# Patient Record
Sex: FEMALE | Race: BLACK OR AFRICAN AMERICAN | NOT HISPANIC OR LATINO | Employment: UNEMPLOYED | URBAN - METROPOLITAN AREA
[De-identification: names, ages, dates, MRNs, and addresses within clinical notes are randomized per-mention and may not be internally consistent; named-entity substitution may affect disease eponyms.]

---

## 2023-05-03 LAB
ALANINE AMINOTRANSFERASE (SGPT) (U/L) IN SER/PLAS: 27 U/L (ref 7–45)
ALBUMIN (G/DL) IN SER/PLAS: 4.2 G/DL (ref 3.4–5)
ALKALINE PHOSPHATASE (U/L) IN SER/PLAS: 48 U/L (ref 33–110)
ANION GAP IN SER/PLAS: 11 MMOL/L (ref 10–20)
ASPARTATE AMINOTRANSFERASE (SGOT) (U/L) IN SER/PLAS: 15 U/L (ref 9–39)
BASOPHILS (10*3/UL) IN BLOOD BY AUTOMATED COUNT: 0.01 X10E9/L (ref 0–0.1)
BASOPHILS/100 LEUKOCYTES IN BLOOD BY AUTOMATED COUNT: 0.3 % (ref 0–2)
BILIRUBIN TOTAL (MG/DL) IN SER/PLAS: 0.7 MG/DL (ref 0–1.2)
C REACTIVE PROTEIN (MG/L) IN SER/PLAS: <0.1 MG/DL
CALCIUM (MG/DL) IN SER/PLAS: 9.2 MG/DL (ref 8.6–10.6)
CARBON DIOXIDE, TOTAL (MMOL/L) IN SER/PLAS: 29 MMOL/L (ref 21–32)
CHLORIDE (MMOL/L) IN SER/PLAS: 102 MMOL/L (ref 98–107)
CREATINE KINASE (U/L) IN SER/PLAS: 56 U/L (ref 0–215)
CREATININE (MG/DL) IN SER/PLAS: 0.59 MG/DL (ref 0.5–1.05)
EOSINOPHILS (10*3/UL) IN BLOOD BY AUTOMATED COUNT: 0.05 X10E9/L (ref 0–0.7)
EOSINOPHILS/100 LEUKOCYTES IN BLOOD BY AUTOMATED COUNT: 1.3 % (ref 0–6)
ERYTHROCYTE DISTRIBUTION WIDTH (RATIO) BY AUTOMATED COUNT: 11 % (ref 11.5–14.5)
ERYTHROCYTE MEAN CORPUSCULAR HEMOGLOBIN CONCENTRATION (G/DL) BY AUTOMATED: 32.9 G/DL (ref 32–36)
ERYTHROCYTE MEAN CORPUSCULAR VOLUME (FL) BY AUTOMATED COUNT: 92 FL (ref 80–100)
ERYTHROCYTES (10*6/UL) IN BLOOD BY AUTOMATED COUNT: 4.05 X10E12/L (ref 4–5.2)
GFR FEMALE: >90 ML/MIN/1.73M2
GLUCOSE (MG/DL) IN SER/PLAS: 267 MG/DL (ref 74–99)
HEMATOCRIT (%) IN BLOOD BY AUTOMATED COUNT: 37.4 % (ref 36–46)
HEMOGLOBIN (G/DL) IN BLOOD: 12.3 G/DL (ref 12–16)
IMMATURE GRANULOCYTES/100 LEUKOCYTES IN BLOOD BY AUTOMATED COUNT: 0 % (ref 0–0.9)
LEUKOCYTES (10*3/UL) IN BLOOD BY AUTOMATED COUNT: 3.9 X10E9/L (ref 4.4–11.3)
LYMPHOCYTES (10*3/UL) IN BLOOD BY AUTOMATED COUNT: 1.61 X10E9/L (ref 1.2–4.8)
LYMPHOCYTES/100 LEUKOCYTES IN BLOOD BY AUTOMATED COUNT: 41.4 % (ref 13–44)
MONOCYTES (10*3/UL) IN BLOOD BY AUTOMATED COUNT: 0.22 X10E9/L (ref 0.1–1)
MONOCYTES/100 LEUKOCYTES IN BLOOD BY AUTOMATED COUNT: 5.7 % (ref 2–10)
NEUTROPHILS (10*3/UL) IN BLOOD BY AUTOMATED COUNT: 2 X10E9/L (ref 1.2–7.7)
NEUTROPHILS/100 LEUKOCYTES IN BLOOD BY AUTOMATED COUNT: 51.3 % (ref 40–80)
NRBC (PER 100 WBCS) BY AUTOMATED COUNT: 0 /100 WBC (ref 0–0)
PLATELETS (10*3/UL) IN BLOOD AUTOMATED COUNT: 223 X10E9/L (ref 150–450)
POTASSIUM (MMOL/L) IN SER/PLAS: 3.9 MMOL/L (ref 3.5–5.3)
PROTEIN TOTAL: 6.9 G/DL (ref 6.4–8.2)
RHEUMATOID FACTOR (IU/ML) IN SERUM OR PLASMA: <10 IU/ML (ref 0–15)
SEDIMENTATION RATE, ERYTHROCYTE: 3 MM/H (ref 0–20)
SODIUM (MMOL/L) IN SER/PLAS: 138 MMOL/L (ref 136–145)
THYROTROPIN (MIU/L) IN SER/PLAS BY DETECTION LIMIT <= 0.05 MIU/L: 1.08 MIU/L (ref 0.44–3.98)
UREA NITROGEN (MG/DL) IN SER/PLAS: 6 MG/DL (ref 6–23)

## 2023-05-04 LAB — ANTI-NUCLEAR ANTIBODY (ANA): NEGATIVE

## 2023-05-05 LAB — HLAB27 TYPING: NEGATIVE

## 2023-05-08 LAB — CITRULLINE ANTIBODY, IGG: 5 UNITS (ref 0–19)

## 2023-10-26 PROBLEM — M79.7 FIBROMYALGIA: Status: ACTIVE | Noted: 2023-10-26

## 2023-10-26 PROBLEM — A59.9 TRICHOMONAS VAGINALIS INFECTION: Status: ACTIVE | Noted: 2023-10-26

## 2023-10-26 PROBLEM — M54.6 THORACIC SPINE PAIN: Status: ACTIVE | Noted: 2023-10-26

## 2023-10-26 PROBLEM — H52.202 ASTIGMATISM OF LEFT EYE: Status: ACTIVE | Noted: 2023-10-26

## 2023-10-26 PROBLEM — R35.0 URINE FREQUENCY: Status: ACTIVE | Noted: 2023-10-26

## 2023-10-26 PROBLEM — M54.42 CHRONIC BILATERAL LOW BACK PAIN WITH BILATERAL SCIATICA: Status: ACTIVE | Noted: 2023-10-26

## 2023-10-26 PROBLEM — E66.3 OVERWEIGHT (BMI 25.0-29.9): Status: ACTIVE | Noted: 2023-10-26

## 2023-10-26 PROBLEM — N93.9 ABNORMAL UTERINE BLEEDING: Status: ACTIVE | Noted: 2023-10-26

## 2023-10-26 PROBLEM — H52.203 ASTIGMATISM OF BOTH EYES: Status: ACTIVE | Noted: 2023-10-26

## 2023-10-26 PROBLEM — R79.89 LOW VITAMIN D LEVEL: Status: ACTIVE | Noted: 2023-10-26

## 2023-10-26 PROBLEM — G89.29 CHRONIC BILATERAL LOW BACK PAIN WITH BILATERAL SCIATICA: Status: ACTIVE | Noted: 2023-10-26

## 2023-10-26 PROBLEM — M54.16 LUMBAR NEURITIS: Status: ACTIVE | Noted: 2023-10-26

## 2023-10-26 PROBLEM — M99.09 SEGMENTAL AND SOMATIC DYSFUNCTION: Status: ACTIVE | Noted: 2023-10-26

## 2023-10-26 PROBLEM — E11.9 ENCOUNTER FOR DIABETIC FOOT EXAM (MULTI): Status: ACTIVE | Noted: 2023-10-26

## 2023-10-26 PROBLEM — N92.0 POLYMENORRHEA: Status: ACTIVE | Noted: 2023-10-26

## 2023-10-26 PROBLEM — H52.201 MYOPIA OF RIGHT EYE WITH ASTIGMATISM: Status: ACTIVE | Noted: 2023-10-26

## 2023-10-26 PROBLEM — B37.31 VAGINAL YEAST INFECTION: Status: ACTIVE | Noted: 2023-10-26

## 2023-10-26 PROBLEM — H52.11 MYOPIA OF RIGHT EYE WITH ASTIGMATISM: Status: ACTIVE | Noted: 2023-10-26

## 2023-10-26 PROBLEM — N60.19 FIBROCYSTIC BREAST CHANGES: Status: ACTIVE | Noted: 2023-10-26

## 2023-10-26 PROBLEM — E04.1 SOLITARY THYROID NODULE: Status: ACTIVE | Noted: 2023-10-26

## 2023-10-26 PROBLEM — M79.10 MYALGIA: Status: ACTIVE | Noted: 2023-10-26

## 2023-10-26 PROBLEM — D31.41 NEVUS OF IRIS OF RIGHT EYE: Status: ACTIVE | Noted: 2023-10-26

## 2023-10-26 PROBLEM — N39.0 ACUTE UTI: Status: ACTIVE | Noted: 2023-10-26

## 2023-10-26 PROBLEM — A74.9 CHLAMYDIA INFECTION: Status: ACTIVE | Noted: 2023-10-26

## 2023-10-26 PROBLEM — A60.00 HERPES GENITALIA: Status: ACTIVE | Noted: 2023-10-26

## 2023-10-26 PROBLEM — E04.0 DIFFUSE GOITER: Status: ACTIVE | Noted: 2023-10-26

## 2023-10-26 PROBLEM — E10.9 TYPE 1 DIABETES MELLITUS (MULTI): Status: ACTIVE | Noted: 2023-10-26

## 2023-10-26 PROBLEM — S39.012A STRAIN OF LUMBAR REGION: Status: ACTIVE | Noted: 2023-10-26

## 2023-10-26 PROBLEM — M54.41 CHRONIC BILATERAL LOW BACK PAIN WITH BILATERAL SCIATICA: Status: ACTIVE | Noted: 2023-10-26

## 2023-10-26 RX ORDER — INSULIN GLARGINE 100 [IU]/ML
34 INJECTION, SOLUTION SUBCUTANEOUS
COMMUNITY
Start: 2023-06-15 | End: 2023-11-16 | Stop reason: SDUPTHER

## 2023-10-26 RX ORDER — DULOXETIN HYDROCHLORIDE 60 MG/1
CAPSULE, DELAYED RELEASE ORAL
COMMUNITY
Start: 2023-04-17 | End: 2023-10-30

## 2023-10-26 RX ORDER — FLUCONAZOLE 150 MG/1
TABLET ORAL
COMMUNITY
Start: 2021-09-09 | End: 2023-10-30

## 2023-10-26 RX ORDER — ACETAMINOPHEN 500 MG
125 TABLET ORAL
COMMUNITY
Start: 2023-09-22 | End: 2023-10-30

## 2023-10-26 RX ORDER — IBUPROFEN 600 MG/1
600 TABLET ORAL
COMMUNITY
Start: 2023-06-14 | End: 2024-02-14 | Stop reason: WASHOUT

## 2023-10-26 RX ORDER — CELECOXIB 200 MG/1
1 CAPSULE ORAL 2 TIMES DAILY PRN
COMMUNITY
Start: 2023-05-03 | End: 2023-10-30

## 2023-10-26 RX ORDER — BLOOD-GLUCOSE METER
EACH MISCELLANEOUS
COMMUNITY
Start: 2021-07-08 | End: 2023-11-16 | Stop reason: ALTCHOICE

## 2023-10-26 RX ORDER — ATORVASTATIN CALCIUM 10 MG/1
10 TABLET, FILM COATED ORAL
COMMUNITY
Start: 2023-06-15 | End: 2023-10-30

## 2023-10-26 RX ORDER — BLOOD SUGAR DIAGNOSTIC
STRIP MISCELLANEOUS
COMMUNITY
Start: 2022-02-23 | End: 2023-10-30

## 2023-10-26 RX ORDER — INSULIN LISPRO 100 [IU]/ML
INJECTION, SOLUTION INTRAVENOUS; SUBCUTANEOUS
COMMUNITY
Start: 2020-12-18 | End: 2023-11-16 | Stop reason: SDUPTHER

## 2023-10-26 RX ORDER — MELOXICAM 15 MG/1
1 TABLET ORAL DAILY PRN
COMMUNITY
Start: 2023-02-09 | End: 2024-02-14 | Stop reason: WASHOUT

## 2023-10-26 RX ORDER — LISINOPRIL 5 MG/1
5 TABLET ORAL
COMMUNITY
Start: 2023-06-22 | End: 2023-10-30

## 2023-10-26 RX ORDER — GABAPENTIN 300 MG/1
1 CAPSULE ORAL NIGHTLY
COMMUNITY
Start: 2023-05-29 | End: 2024-02-14 | Stop reason: WASHOUT

## 2023-10-26 RX ORDER — METHYLPREDNISOLONE 4 MG/1
TABLET ORAL
COMMUNITY
Start: 2023-02-09 | End: 2023-10-30

## 2023-10-26 RX ORDER — METHOCARBAMOL 500 MG/1
500 TABLET, FILM COATED ORAL
COMMUNITY
Start: 2023-05-19 | End: 2024-02-14 | Stop reason: WASHOUT

## 2023-10-30 ENCOUNTER — OFFICE VISIT (OUTPATIENT)
Dept: OBSTETRICS AND GYNECOLOGY | Facility: CLINIC | Age: 28
End: 2023-10-30
Payer: COMMERCIAL

## 2023-10-30 VITALS
BODY MASS INDEX: 23.05 KG/M2 | HEIGHT: 64 IN | SYSTOLIC BLOOD PRESSURE: 120 MMHG | DIASTOLIC BLOOD PRESSURE: 72 MMHG | WEIGHT: 135 LBS

## 2023-10-30 DIAGNOSIS — N89.8 VAGINAL LESION: Primary | ICD-10-CM

## 2023-10-30 DIAGNOSIS — Z11.3 SCREEN FOR STD (SEXUALLY TRANSMITTED DISEASE): ICD-10-CM

## 2023-10-30 DIAGNOSIS — Z30.011 ENCOUNTER FOR INITIAL PRESCRIPTION OF CONTRACEPTIVE PILLS: ICD-10-CM

## 2023-10-30 DIAGNOSIS — N90.89 VULVAR LESION: ICD-10-CM

## 2023-10-30 DIAGNOSIS — B37.31 YEAST INFECTION INVOLVING THE VAGINA AND SURROUNDING AREA: ICD-10-CM

## 2023-10-30 PROCEDURE — 99214 OFFICE O/P EST MOD 30 MIN: CPT | Performed by: OBSTETRICS & GYNECOLOGY

## 2023-10-30 PROCEDURE — 3078F DIAST BP <80 MM HG: CPT | Performed by: OBSTETRICS & GYNECOLOGY

## 2023-10-30 PROCEDURE — 3074F SYST BP LT 130 MM HG: CPT | Performed by: OBSTETRICS & GYNECOLOGY

## 2023-10-30 PROCEDURE — 87205 SMEAR GRAM STAIN: CPT

## 2023-10-30 PROCEDURE — 87800 DETECT AGNT MULT DNA DIREC: CPT

## 2023-10-30 RX ORDER — DROSPIRENONE 4 MG/1
4 TABLET, FILM COATED ORAL DAILY
Qty: 28 TABLET | Refills: 11 | Status: SHIPPED | OUTPATIENT
Start: 2023-10-30 | End: 2024-02-14 | Stop reason: WASHOUT

## 2023-10-30 RX ORDER — FLUCONAZOLE 150 MG/1
150 TABLET ORAL ONCE
Qty: 2 TABLET | Refills: 0 | Status: SHIPPED | OUTPATIENT
Start: 2023-10-30 | End: 2023-10-30

## 2023-10-30 RX ORDER — VALACYCLOVIR HYDROCHLORIDE 500 MG/1
500 TABLET, FILM COATED ORAL 2 TIMES DAILY
Qty: 6 TABLET | Refills: 1 | Status: SHIPPED | OUTPATIENT
Start: 2023-10-30 | End: 2023-11-02

## 2023-10-30 ASSESSMENT — ENCOUNTER SYMPTOMS
ENDOCRINE NEGATIVE: 0
CARDIOVASCULAR NEGATIVE: 0
EYES NEGATIVE: 0
NEUROLOGICAL NEGATIVE: 0
MUSCULOSKELETAL NEGATIVE: 0
GASTROINTESTINAL NEGATIVE: 0
RESPIRATORY NEGATIVE: 0
HEMATOLOGIC/LYMPHATIC NEGATIVE: 0
PSYCHIATRIC NEGATIVE: 0
CONSTITUTIONAL NEGATIVE: 0
ALLERGIC/IMMUNOLOGIC NEGATIVE: 0

## 2023-10-30 ASSESSMENT — PAIN SCALES - GENERAL: PAINLEVEL: 2

## 2023-10-30 NOTE — PROGRESS NOTES
"Subjective   Patient ID: Sindy Olguin is a 28 y.o. female who presents for Vaginal lesions.    HPI   On and off for a few weeks   Went to a clinic and blood work was completed for HSV came back negative   In 2017 was diagnosed with herpes  Would like a second opinion   Has discharge currently, white  Itchy with an odor   Was given a pill with no relief  UTD on pap oct 2023 negative    Review of Systems   Genitourinary:  Positive for genital sores.        Vaginal discharge and pruritus and odor for about 2-3 weeks       Objective   /72 (BP Location: Right arm, Patient Position: Sitting)   Ht 1.626 m (5' 4\")   Wt 61.2 kg (135 lb)   LMP 10/01/2023   BMI 23.17 kg/m²     Physical Exam  Constitutional:       Appearance: Normal appearance.   Pulmonary:      Effort: Pulmonary effort is normal.   Genitourinary:     General: Normal vulva.      Labia:         Right: No lesion.         Left: No lesion.       Vagina: Vaginal discharge present.      Cervix: Discharge present.      Rectum: Normal.   Musculoskeletal:      Cervical back: Neck supple.   Skin:     General: Skin is warm.   Neurological:      Mental Status: She is alert.   Psychiatric:         Attention and Perception: Attention normal.         Mood and Affect: Mood normal.         Speech: Speech normal.         Behavior: Behavior normal.         Thought Content: Thought content normal.         Assessment/Plan   Diagnoses and all orders for this visit:  Vaginal lesion  Screen for STD (sexually transmitted disease)  -     Syphilis Screen with Reflex; Future  -     HIV; Future  -     Hepatitis C Antibody; Future  -     Hepatitis B surface Ag; Future  -     C. trachomatis + N. gonorrhoeae, Amplified; Future  Yeast infection involving the vagina and surrounding area  -     fluconazole (Diflucan) 150 mg tablet; Take 1 tablet (150 mg) by mouth 1 time for 1 dose. Repeat in 7 days if symptoms persist.  -     Vaginitis Gram Stain For Bacterial Vaginosis + Yeast; " Future  Encounter for initial prescription of contraceptive pills  -     drospirenone, contraceptive, (Slynd) 4 mg (28) tablet; Take 1 tablet by mouth once daily.  Vulvar lesion  -     valACYclovir (Valtrex) 500 mg tablet; Take 1 tablet (500 mg) by mouth 2 times a day for 3 days.    Discussed DM tighter glucose control to better manage her yeast infection and it is essential she be on a contraception of any kind for protection  Discussed returning to clinic should symptoms continue and change contraception if patient desires

## 2023-10-31 LAB
C TRACH RRNA SPEC QL NAA+PROBE: NEGATIVE
N GONORRHOEA DNA SPEC QL PROBE+SIG AMP: NEGATIVE

## 2023-10-31 NOTE — PROGRESS NOTES
I saw and evaluated the patient. I personally obtained the key and critical portions of the history and physical exam or was physically present for key and critical portions performed by the resident/fellow. I reviewed the resident/fellow's documentation and discussed the patient with the resident/fellow. I agree with the resident/fellow's medical decision making as documented in the note.    Nelda Murray MD

## 2023-11-01 LAB
CLUE CELLS VAG LPF-#/AREA: PRESENT /[LPF]
NUGENT SCORE: 7
YEAST VAG WET PREP-#/AREA: ABNORMAL

## 2023-11-02 ENCOUNTER — LAB (OUTPATIENT)
Dept: LAB | Facility: LAB | Age: 28
End: 2023-11-02
Payer: COMMERCIAL

## 2023-11-02 DIAGNOSIS — Z11.3 SCREEN FOR STD (SEXUALLY TRANSMITTED DISEASE): ICD-10-CM

## 2023-11-02 DIAGNOSIS — B96.89 BACTERIAL VAGINOSIS: ICD-10-CM

## 2023-11-02 DIAGNOSIS — N76.0 BACTERIAL VAGINOSIS: ICD-10-CM

## 2023-11-02 LAB
HBV SURFACE AG SERPL QL IA: NONREACTIVE
HCV AB SER QL: NONREACTIVE
HIV 1+2 AB+HIV1 P24 AG SERPL QL IA: NONREACTIVE
T PALLIDUM AB SER QL: NONREACTIVE

## 2023-11-02 PROCEDURE — 36415 COLL VENOUS BLD VENIPUNCTURE: CPT

## 2023-11-02 PROCEDURE — 87340 HEPATITIS B SURFACE AG IA: CPT

## 2023-11-02 PROCEDURE — 86780 TREPONEMA PALLIDUM: CPT

## 2023-11-02 PROCEDURE — 86803 HEPATITIS C AB TEST: CPT

## 2023-11-02 PROCEDURE — 87389 HIV-1 AG W/HIV-1&-2 AB AG IA: CPT

## 2023-11-02 RX ORDER — METRONIDAZOLE 500 MG/1
500 TABLET ORAL 2 TIMES DAILY
Qty: 14 TABLET | Refills: 0 | Status: SHIPPED | OUTPATIENT
Start: 2023-11-02 | End: 2023-11-09

## 2023-11-02 NOTE — PROGRESS NOTES
Patient sent message  to discuss test results.   Patient identified by name and . Patient informed her test results came back +bacterial vaginosis.   Patient informed the treatment for bacterial vaginosis is metronidazole, one pill taken twice a day for seven days   Prescription will be sent to pharmacy, patient aware.  Patient verbalized understanding and all questions were answered.

## 2023-11-16 ENCOUNTER — OFFICE VISIT (OUTPATIENT)
Dept: ENDOCRINOLOGY | Facility: CLINIC | Age: 28
End: 2023-11-16
Payer: COMMERCIAL

## 2023-11-16 VITALS
HEART RATE: 83 BPM | BODY MASS INDEX: 23.18 KG/M2 | WEIGHT: 135.8 LBS | DIASTOLIC BLOOD PRESSURE: 70 MMHG | TEMPERATURE: 98.1 F | SYSTOLIC BLOOD PRESSURE: 105 MMHG | HEIGHT: 64 IN

## 2023-11-16 DIAGNOSIS — E04.1 THYROID NODULE: ICD-10-CM

## 2023-11-16 DIAGNOSIS — E13.9: ICD-10-CM

## 2023-11-16 DIAGNOSIS — E13.9: Primary | ICD-10-CM

## 2023-11-16 PROCEDURE — 99215 OFFICE O/P EST HI 40 MIN: CPT | Performed by: STUDENT IN AN ORGANIZED HEALTH CARE EDUCATION/TRAINING PROGRAM

## 2023-11-16 PROCEDURE — 3078F DIAST BP <80 MM HG: CPT | Performed by: STUDENT IN AN ORGANIZED HEALTH CARE EDUCATION/TRAINING PROGRAM

## 2023-11-16 PROCEDURE — 3074F SYST BP LT 130 MM HG: CPT | Performed by: STUDENT IN AN ORGANIZED HEALTH CARE EDUCATION/TRAINING PROGRAM

## 2023-11-16 PROCEDURE — 1036F TOBACCO NON-USER: CPT | Performed by: STUDENT IN AN ORGANIZED HEALTH CARE EDUCATION/TRAINING PROGRAM

## 2023-11-16 RX ORDER — CHLORPHENIR/PHENYLEPH/ASPIRIN 2-7.8-325
1 TABLET, EFFERVESCENT ORAL AS NEEDED
Qty: 50 EACH | Refills: 3 | Status: SHIPPED | OUTPATIENT
Start: 2023-11-16 | End: 2024-02-13 | Stop reason: SDUPTHER

## 2023-11-16 RX ORDER — INSULIN GLARGINE 100 [IU]/ML
INJECTION, SOLUTION SUBCUTANEOUS
Qty: 15 ML | Refills: 3 | Status: SHIPPED | OUTPATIENT
Start: 2023-11-16 | End: 2024-02-13 | Stop reason: SDUPTHER

## 2023-11-16 RX ORDER — BLOOD SUGAR DIAGNOSTIC
100 STRIP MISCELLANEOUS DAILY
Qty: 100 EACH | Refills: 2 | Status: SHIPPED | OUTPATIENT
Start: 2023-11-16 | End: 2024-02-13 | Stop reason: SDUPTHER

## 2023-11-16 RX ORDER — GLUCAGON 3 MG/1
1 POWDER NASAL ONCE AS NEEDED
Qty: 2 EACH | Refills: 3 | Status: SHIPPED | OUTPATIENT
Start: 2023-11-16 | End: 2024-02-27 | Stop reason: WASHOUT

## 2023-11-16 RX ORDER — INSULIN PUMP SYRINGE, 3 ML
1 EACH MISCELLANEOUS AS NEEDED
Qty: 1 EACH | Refills: 3 | Status: SHIPPED | OUTPATIENT
Start: 2023-11-16 | End: 2024-11-15

## 2023-11-16 RX ORDER — BLOOD-GLUCOSE SENSOR
EACH MISCELLANEOUS
Qty: 2 EACH | Refills: 9 | Status: SHIPPED | OUTPATIENT
Start: 2023-11-16 | End: 2024-02-13 | Stop reason: SDUPTHER

## 2023-11-16 RX ORDER — LANCETS
100 EACH MISCELLANEOUS DAILY
Qty: 100 EACH | Refills: 2 | Status: SHIPPED | OUTPATIENT
Start: 2023-11-16

## 2023-11-16 RX ORDER — INSULIN LISPRO 100 [IU]/ML
INJECTION, SOLUTION INTRAVENOUS; SUBCUTANEOUS
Qty: 15 ML | Refills: 3 | Status: SHIPPED | OUTPATIENT
Start: 2023-11-16 | End: 2024-02-13 | Stop reason: SDUPTHER

## 2023-11-16 NOTE — PATIENT INSTRUCTIONS
-follow the insulin scale as instructed on paper   -bring your sugar logs at the next visit  -get your labs done before the next appt    Follow up in 3 months    Graciela Cui MD  Divison of Endocrinology   [facility]   Phone: [phone]    option 4, then option 1  Fax: [phone]

## 2023-11-16 NOTE — PROGRESS NOTES
28 F PMH: DM, goiter/thyroid nodule     Low C peptide initially but on subsequent testing in 2018 showing it is detectable, STARR and IA1 ab negative     Diabetes History     DM diagnosed around 2014  Complications Micro and Macro-albuminuria   A1c:   Lab Results   Component Value Date    HGBA1C 11.4 (A) 10/31/2022   10/19/2023 14%     Regimen   Lantus 8-8  Humalog 4-4-4 with SF 1:25 target --> doing 14-14-14 standard     SMBG   Has a  shanika 3 but not connected   Hypoglycemia none     Diet: non carb restricted     Comorbidities and Screening  Eye Exam:   Foot exam     Lipid  Lab Results   Component Value Date    LDLF 69 12/14/2021    TRIG 243 (H) 12/14/2021         Statin- none  Cr and albuminuria- +albuminuria   Lab Results   Component Value Date    CREATININE 0.59 05/03/2023      ACE/ARB- none     THYROID:   Had FNA of 1.7cm right nodule- non diagnostic 8/2021  Then repeated in 10/2021 and was a benign follicular nodule       Past Medical History:   Diagnosis Date    Encounter for screening for infections with a predominantly sexual mode of transmission 01/15/2019    Routine screening for STI (sexually transmitted infection)    Encounter for surveillance of injectable contraceptive 01/14/2021    Encounter for Depo-Provera contraception    Other conditions influencing health status 01/17/2017    Uncontrolled type 2 diabetes mellitus    Other problems related to lifestyle 01/04/2016    Other problems related to lifestyle    Other specified health status 04/12/2019    No pertinent past medical history    Type 2 diabetes mellitus without complications (CMS/[facility]) 10/27/2020    Diabetes type 2, controlled     Family History   Problem Relation Name Age of Onset    Diabetes type II Mother      Diabetes type II Mother's Brother      Diabetes type II Maternal Grandmother      Other (Autoimmune disor) Other        Social History     Socioeconomic History    Marital status: Single     Spouse name: Not on file    Number of  children: Not on file    Years of education: Not on file    Highest education level: Not on file   Occupational History    Not on file   Tobacco Use    Smoking status: Never     Passive exposure: Never    Smokeless tobacco: Never   Substance and Sexual Activity    Alcohol use: Never    Drug use: Never    Sexual activity: Not on file   Other Topics Concern    Not on file   Social History Narrative    Not on file     Social Determinants of Health     Financial Resource Strain: Not on file   Food Insecurity: Not on file   Transportation Needs: Not on file   Physical Activity: Not on file   Stress: Not on file   Social Connections: Not on file   Intimate Partner Violence: Not on file   Housing Stability: Not on file        ROS:  No polyuria polydipsia  Weight stable   Negative except those noted in current and interim history    Physical Exam  Constitutional:       Appearance: Normal appearance.   Cardiovascular:      Rate and Rhythm: Normal rate and regular rhythm.   Pulmonary:      Effort: Pulmonary effort is normal.      Breath sounds: Normal breath sounds.   Skin:     Comments: No lipodystrophy    Neurological:      Mental Status: She is alert.          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), managed as type 2 (CMS/[facility])    -  Primary    Relevant Medications    insulin glargine (Lantus) 100 unit/mL (3 mL) pen    insulin lispro (HumaLOG) 100 unit/mL injection    OneTouch Ultra Test strip    FreeStyle glucose monitoring (OneTouch Ultra2 Meter) kit    lancets (OneTouch UltraSoft Lancets) misc    glucagon (Glucagen) 1 mg injection    glucagon (Baqsimi) 3 mg/actuation spray,non-aerosol    blood-glucose sensor (FreeStyle Marquez 3 Sensor) device    acetone, urine, test (Ketone Urine Test) strip    Other Relevant Orders    Albumin , Urine Random    C-Peptide    Hemoglobin  A1C    Glutamic Acid Decarboxylase AB    Lipid Panel    Renal Function Panel    Referral to Nutrition Services    Referral to Diabetic Education    Referral to Podiatry    Referral to Ophthalmology    Thyroid nodule        Relevant Orders    US thyroid          DM1 vs 2  She is lean, no physical features of insulin resistance but antibodies negative with a detectable c peptide back in 2018     Uncontrolled  Non carb controlled diet and drinks soda     Switch to once daily lantus 20   Lispro 6-6-6 with sliding scale 1:50 >150  CGM  Nutrition consult   Podiatry-per pt request  Ophtha consult  Needs ACE/ARB-pregnancy precautions, will discuss at next visit    Will recheck residual pancreatic function when less glucotoxic, recheck antibodies.  Labs prior to next visit     Thyroid nodule:  Repeat thyroid ultrasound now     Time spent  Coordination of care and Plan communicated to PCP electronically via EMR  Total time spent 43 in this encounter including chart review, coordination of care, history physical exam, counseling and placing lab orders

## 2023-11-27 DIAGNOSIS — E13.9: ICD-10-CM

## 2023-11-27 RX ORDER — GLUCAGON 1 MG
VIAL (EA) INJECTION
Qty: 1 EACH | Refills: 2 | Status: SHIPPED | OUTPATIENT
Start: 2023-11-27 | End: 2023-11-29

## 2023-11-29 RX ORDER — GLUCAGON 1 MG
VIAL (EA) INJECTION
Qty: 1 KIT | Refills: 2 | Status: SHIPPED | OUTPATIENT
Start: 2023-11-29 | End: 2023-11-29 | Stop reason: WASHOUT

## 2023-12-12 ENCOUNTER — APPOINTMENT (OUTPATIENT)
Dept: ENDOCRINOLOGY | Facility: CLINIC | Age: 28
End: 2023-12-12
Payer: COMMERCIAL

## 2023-12-22 ENCOUNTER — APPOINTMENT (OUTPATIENT)
Dept: ENDOCRINOLOGY | Facility: CLINIC | Age: 28
End: 2023-12-22
Payer: COMMERCIAL

## 2023-12-28 ENCOUNTER — APPOINTMENT (OUTPATIENT)
Dept: OBSTETRICS AND GYNECOLOGY | Facility: CLINIC | Age: 28
End: 2023-12-28
Payer: COMMERCIAL

## 2024-01-04 ENCOUNTER — OFFICE VISIT (OUTPATIENT)
Dept: PAIN MEDICINE | Facility: CLINIC | Age: 29
End: 2024-01-04
Payer: COMMERCIAL

## 2024-01-04 DIAGNOSIS — M79.7 FIBROMYALGIA: Primary | ICD-10-CM

## 2024-01-04 PROCEDURE — 1036F TOBACCO NON-USER: CPT | Performed by: PAIN MEDICINE

## 2024-01-04 PROCEDURE — 99213 OFFICE O/P EST LOW 20 MIN: CPT | Performed by: PAIN MEDICINE

## 2024-01-04 RX ORDER — TOPIRAMATE 25 MG/1
TABLET ORAL
Qty: 196 TABLET | Refills: 0 | Status: SHIPPED | OUTPATIENT
Start: 2024-01-04 | End: 2024-02-14 | Stop reason: WASHOUT

## 2024-01-04 SDOH — SOCIAL STABILITY: SOCIAL NETWORK: SOCIAL ACTIVITY:: 10

## 2024-01-04 ASSESSMENT — PAIN SCALES - GENERAL: PAINLEVEL_OUTOF10: 6

## 2024-01-04 ASSESSMENT — PAIN - FUNCTIONAL ASSESSMENT: PAIN_FUNCTIONAL_ASSESSMENT: 0-10

## 2024-01-04 NOTE — PROGRESS NOTES
1/4/2024    Ms. Rascon a pleasant 28 y AA lady came in today new to me. She saw Dr. Nails few month ago. She also seeing pain physician at [facility]. Main complain is diffuse body pain. She has been diagnosed with fibromyalgia . She tried pt made her pain worse. Tried many medication treatment non gave her relief. She had MRI lumbar spine few month ago no significant findings. She is worry about a bone part she feels in her buttock on the rt side. I examined her it *is the rt tubercle of the sacral hiatus. Her exam no different than diffuse tenderness with multiple tender points.    We talked again on the fibromyalgia and what the advisable line of management     P    Refer to [facility] program   Topmax titration                               Diagnoses/Problems     · Fibromyalgia (729.1) (M79.7)   · Lumbar neuritis (724.4) (M54.16)     Provider Impressions     The patientÂ´s history, physical exam and personal review of imaging indicate diagnoses of:  -Fibromyalgia due to her symptoms and the significant number of tender points as well as pain throughout her body that are all consistent with a diagnosis of fibromyalgia  -Lumbar neuritis, this may be result of hernia disc as she does have radiating pain down the bilateral lower extremities      Plan:  -the mainstay of managing fibromyalgia is to participate in low-impact aerobic exercising for 1 hour daily, 6 days a week. Examples of low impact aerobic exercising include swimming, riding a stationary bicycle or exercising on an elliptical machine. Low-impact aerobic exercising at that rate results in much better pain relief than any of the medications that could be prescribed for fibromyalgia and without side effects. The patient must be compliant, however, and must participate in such therapy for 8 weeks consecutively before noticing a remarkable response. It was explained to the patient that one could titrate up from a smaller duration of exercising by  increasing the time spent doing the low-impact aerobic exercise in small increments such as 1 minute every day until meeting the 60 minutes a day, 6 days a week goal. The patient was receptive to the counseling and agrees to continue to exercise toward this goal.   -We will start the patient on duloxetine 30 mg in the first week and increase to 60 mg, patient was counseled on side effects medication and when to stop medication if the side effects occur  -Continue physical therapy for at least weeks, if persistent pain still she can call us back and we can consider ordering an MRI to further delineate what going on her low back        Counselling:  The patient was counseled regarding diagnostic results, instructions for management, risk factor reductions, prognosis, patient and family education, impressions, risk and benefits of treatment options, as well as adherence to current treatment regimen. The importance of physical therapy/core strengthening was discussed in regard to an appropriate level for the patient to participate in currently, as well as progress as able.     All questions and concerns were addressed during clinical visit. Patient verbalized understanding and agreement with the current plan and counselling. The patient was invited to contact us back anytime with any questions or concerns and follow-up with us in the future.        Chief Complaint        History of Present Illness  27 year old female presents with low back pain. Patient is presenting today as a referral from spine surgery with back pain that started August 2020. She states the pain is predominantly localized to her lumbar spine as well as tailbone, no inciting injury or event. The pain is described as a dull ache that can be sharp/stabbing sensation, occasionally rating down her bilateral legs when she is standing or walking for prolonged periods of time greater than 30 to 45 minutes. The pain is also present at a low level when she is  sitting or lying down and only goes away completely when she is asleep. It does not wake her up at night. The pain can be 10/10 in severity when she is standing or walking, it is not particularly exacerbated by lifting bending coughing sneezing or straining. She also admits tiredness during the day and diffuse body pains that have been present for over 1 year as well. She has tried NSAIDs as well as physical therapy and chiropractic care without significant relief. She has not had any advanced imaging of her low back and is already with the neck steps are to see if this can help. She does admit some diffuse body pain and states that her pain occasionally radiates up her back.        The patient currently denies worsening numbness, tingling, or motor weakness.  The patient denies fever, chills, night sweats, headache, weight loss, change in bowel/bladder function.     Interventions tried:  -NSAID's, no relief   -Tylenol, no relief  -PT: has completed 2 full weeks of physical therapy thus far and admits minimal relief  -Injections: None                 Review of Systems     13 systems all normal except noted in HP.   Musculoskeletal: back pain and limb pain.      Active Problems     · Abnormal uterine bleeding (626.9) (N93.9)   · Acute UTI (599.0) (N39.0)   · Astigmatism of both eyes (367.20) (H52.203)   · Astigmatism of left eye (367.20) (H52.202)   · Breast pain (611.71) (N64.4)   · Chlamydia infection (079.98) (A74.9)   · Chronic bilateral low back pain with bilateral sciatica (724.2,724.3,338.29)  (M54.42,M54.41,G89.29)   · Diffuse goiter (240.9) (E04.0)   · Encounter for Depo-Provera contraception (V25.49) (Z30.42)   · Encounter for diabetic foot exam (250.00) (E11.9)   · Fibrocystic breast changes (610.1) (N60.19)   · Herpes genitalia (054.10) (A60.00)   · Low back pain radiating to both legs (724.2) (M54.50,M79.604,M79.605)   · Low vitamin D level (790.6) (R79.89)   · Myalgia (729.1) (M79.10)   · Myopia of  right eye with astigmatism (367.1,367.20) (H52.11,H52.201)   · 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)   · Other problems related to lifestyle (V69.8) (Z72.89)   · Overweight (BMI 25.0-29.9) (278.02) (E66.3)   · Polymenorrhea (626.2) (N92.0)   · Screening for STDs (sexually transmitted diseases) (V74.5) (Z11.3)   · Segmental and somatic dysfunction of cervical region (739.1) (M99.01)   · Segmental and somatic dysfunction of lumbar region (739.3) (M99.03)   · Segmental and somatic dysfunction of pelvic region (739.5) (M99.05)   · Segmental and somatic dysfunction of thoracic region (739.2) (M99.02)   · Solitary thyroid nodule (241.0) (E04.1)   · STD exposure (V01.6) (Z20.2)   · Strain of lumbar region, initial encounter (847.2) (S39.012A)   · Thoracic spine pain (724.1) (M54.6)   · Trichomonas vaginalis infection (131.01) (A59.9)   · Type 1 diabetes mellitus (250.01) (E10.9)   · Urine frequency (788.41) (R35.0)   · Urine pregnancy test negative (V72.41) (Z32.02)   · Vaginal yeast infection (112.1) (B37.31)   · Vaginal yeast infection (112.1) (B37.31)   · Well woman exam (V72.31) (Z01.419)     Past Medical History     · History of Diabetes type 2, controlled (250.00) (E11.9)   · Resolved Date: 08 Sep 2017   · History of Encounter for Depo-Provera contraception (V25.49) (Z30.42)   · Resolved Date: 2021   · No pertinent past medical history (V49.89) (Z78.9)   · History of Other problems related to lifestyle (V69.8) (Z72.89)   · Resolved Date: 15 Derrick 2019   · History of Routine screening for STI (sexually transmitted infection) (V74.5) (Z11.3)   · Resolved Date: 10 Mar 2020   · History of Uncontrolled type 2 diabetes mellitus (250.02)   · Resolved Date: 08 Sep 2017     Surgical History     · History of  section   · History of Dental Surgery     Family History     · Family history of type 2 diabetes mellitus (V18.0) (Z83.3)     · Family history of type 2 diabetes mellitus (V18.0) (Z83.3)     · Family  history of type 2 diabetes mellitus (V18.0) (Z83.3)     · Denied: Family history of autoimmune disorder     · No pertinent family history     Social History     ·  ancestry   · Always uses seat belt   · Denied: History of domestic violence   · Never a smoker   · No alcohol use   · No illicit drug use   · Single     Allergies     · No Known Drug Allergies   Recorded By: Red Madrigal; 8/26/2014 8:21:37 AM     Current Meds     Medication Name Instruction   Pinky Stanley 100 UNIT/ML Subcutaneous Solution Pen-injector INJECT 12 UNIT Daily   BD Pen Needle Zhane U/F 32G X 4 MM Use 4 a day with insulin   Dexcom G6  Device Use  device daily for glucose tracking   Dexcom G6 Sensor Change sensor every 10 days as directed   Dexcom G6 Transmitter Replace transmitter after 3 months   FreeStyle Marquez 2 [place] Device Use reader to check your blood glucose 4 x day   FreeStyle Marquez 2 Sensor Change sensor every 14 days as directed   Glucagon Emergency 1 MG Injection Kit USE AS DIRECTED.   Insulin Lispro (1 Unit Dial) 100 UNIT/ML Subcutaneous Solution Pen-injector INJECT 18 UNITS BEFORE MEALS AND SLIDING SCALE AS DIRECTED ,UP TO 12 UNITS 3 TIMES A DAY.   Ketone Test In Vitro Strip test urine for ketones if blood sugar >250, with illness, or if pump malfunctions   Meloxicam 15 MG Oral Tablet TAKE 1 TABLET BY MOUTH EVERY DAY AS NEEDED   Methocarbamol 500 MG Oral Tablet Take 1 tablet daily   methylPREDNISolone 4 MG Oral Tablet Therapy Pack Take as directed   OneTouch Delica Plus [id number] TEST THREE TIMES A DAY   OneTouch Verio In Vitro Strip CHECK BLOOD SUGARS THREE TIMES PER DAY AND AS NEEDED   OneTouch Verio w/Device Kit AS DIRECTED TO TEST BLOOD SUGAR . DX: E11.65 - INSULIN DEPENDENT   OneTouch Verio w/Device Kit USE AS DIRECTED.   Pen Needles 32G X 4 MM USE 4 TIMES A DAY WITH INSULIN   UltiCare Alcohol Swabs 70 % Pad Use one per injection, 2-6 times per day      Physical Exam  Constitutional: No acute  distress, well appearing and well nourished. Patient appears stated age.  Eyes: Conjunctiva non-icteric and eye lids are without obvious rash or drooping. Pupils are symmetric.  Ears, Nose, Mouth, and Throat: External ears and nose appear to be without deformity or rash. No lesions or masses noted.  Hearing is grossly intact.  Neck: No JVD noted, tracheal position is midline.  Head and Face: Examination of the head and face revealed no abnormalities.  Respiratory: No gasping or shortness of breath noted, no use of accessory muscles noted.  Cardiovascular: Examination for edema is normal.   GI: Abdomen nontender to palpation.  Skin: No rashes or open lesions/ulcers identified on skin.     MSK:   No asymmetry or masses noted of the musculature.   Examination of the muscles/joints/bones show normal range of motion.  The patient is tender in the lumbar paraspinal musculature as well as over the coccyx and mutiple tender points throughout her body.     Neurologic:  Motor strength:   5/5 muscle strength of the upper extremities bilateral and equal.  5/5 muscle strength of the lower extremities bilaterally and equal.      Sensation:   Sensation intact to pin prick in the bilateral upper extremities.  Sensation intact to pin prick in the bilateral lower extremities.      Provocative tests: Negative Emerson sign bilaterally, 2+ patellar reflexes bilaterally, MERCEDES not reproduce any SI or groin pain, seated and supine straight leg raise testing not reproduce radicular symptoms bilaterally.     Psychiatric: Mood and affect are normal.        Attending Note

## 2024-01-31 ENCOUNTER — APPOINTMENT (OUTPATIENT)
Dept: RADIOLOGY | Facility: CLINIC | Age: 29
End: 2024-01-31
Payer: COMMERCIAL

## 2024-01-31 ENCOUNTER — HOSPITAL ENCOUNTER (OUTPATIENT)
Dept: RADIOLOGY | Facility: CLINIC | Age: 29
Discharge: HOME | End: 2024-01-31
Payer: COMMERCIAL

## 2024-01-31 DIAGNOSIS — E04.1 THYROID NODULE: ICD-10-CM

## 2024-01-31 PROCEDURE — 76536 US EXAM OF HEAD AND NECK: CPT | Performed by: RADIOLOGY

## 2024-01-31 PROCEDURE — 76536 US EXAM OF HEAD AND NECK: CPT

## 2024-02-08 DIAGNOSIS — M79.7 FIBROMYALGIA: ICD-10-CM

## 2024-02-13 ENCOUNTER — LAB (OUTPATIENT)
Dept: LAB | Facility: LAB | Age: 29
End: 2024-02-13
Payer: COMMERCIAL

## 2024-02-13 ENCOUNTER — OFFICE VISIT (OUTPATIENT)
Dept: ENDOCRINOLOGY | Facility: CLINIC | Age: 29
End: 2024-02-13
Payer: COMMERCIAL

## 2024-02-13 VITALS
HEART RATE: 77 BPM | WEIGHT: 140.8 LBS | DIASTOLIC BLOOD PRESSURE: 71 MMHG | SYSTOLIC BLOOD PRESSURE: 104 MMHG | TEMPERATURE: 98.1 F | BODY MASS INDEX: 24.17 KG/M2

## 2024-02-13 DIAGNOSIS — E13.9: ICD-10-CM

## 2024-02-13 LAB
ALBUMIN SERPL BCP-MCNC: 4.3 G/DL (ref 3.4–5)
ANION GAP SERPL CALC-SCNC: 11 MMOL/L (ref 10–20)
B-OH-BUTYR SERPL-SCNC: 0.09 MMOL/L (ref 0.02–0.27)
BUN SERPL-MCNC: 5 MG/DL (ref 6–23)
CALCIUM SERPL-MCNC: 9.5 MG/DL (ref 8.6–10.6)
CHLORIDE SERPL-SCNC: 101 MMOL/L (ref 98–107)
CHOLEST SERPL-MCNC: 132 MG/DL (ref 0–199)
CHOLESTEROL/HDL RATIO: 2.8
CO2 SERPL-SCNC: 28 MMOL/L (ref 21–32)
CREAT SERPL-MCNC: 0.61 MG/DL (ref 0.5–1.05)
CREAT UR-MCNC: 193.9 MG/DL (ref 20–320)
EGFRCR SERPLBLD CKD-EPI 2021: >90 ML/MIN/1.73M*2
EST. AVERAGE GLUCOSE BLD GHB EST-MCNC: 174 MG/DL
GLUCOSE SERPL-MCNC: 167 MG/DL (ref 74–99)
HBA1C MFR BLD: 7.7 %
HDLC SERPL-MCNC: 47.9 MG/DL
LDLC SERPL CALC-MCNC: 67 MG/DL
MICROALBUMIN UR-MCNC: 150.8 MG/L
MICROALBUMIN/CREAT UR: 77.8 UG/MG CREAT
NON HDL CHOLESTEROL: 84 MG/DL (ref 0–149)
PHOSPHATE SERPL-MCNC: 4.8 MG/DL (ref 2.5–4.9)
POTASSIUM SERPL-SCNC: 4.1 MMOL/L (ref 3.5–5.3)
SODIUM SERPL-SCNC: 136 MMOL/L (ref 136–145)
T4 FREE SERPL-MCNC: 1.11 NG/DL (ref 0.78–1.48)
TRIGL SERPL-MCNC: 85 MG/DL (ref 0–149)
TSH SERPL-ACNC: 1.39 MIU/L (ref 0.44–3.98)
VLDL: 17 MG/DL (ref 0–40)

## 2024-02-13 PROCEDURE — 80061 LIPID PANEL: CPT

## 2024-02-13 PROCEDURE — 95251 CONT GLUC MNTR ANALYSIS I&R: CPT | Performed by: STUDENT IN AN ORGANIZED HEALTH CARE EDUCATION/TRAINING PROGRAM

## 2024-02-13 PROCEDURE — 80069 RENAL FUNCTION PANEL: CPT

## 2024-02-13 PROCEDURE — 83036 HEMOGLOBIN GLYCOSYLATED A1C: CPT

## 2024-02-13 PROCEDURE — 82043 UR ALBUMIN QUANTITATIVE: CPT

## 2024-02-13 PROCEDURE — 82570 ASSAY OF URINE CREATININE: CPT

## 2024-02-13 PROCEDURE — 99214 OFFICE O/P EST MOD 30 MIN: CPT | Performed by: STUDENT IN AN ORGANIZED HEALTH CARE EDUCATION/TRAINING PROGRAM

## 2024-02-13 PROCEDURE — 1036F TOBACCO NON-USER: CPT | Performed by: STUDENT IN AN ORGANIZED HEALTH CARE EDUCATION/TRAINING PROGRAM

## 2024-02-13 PROCEDURE — 82010 KETONE BODYS QUAN: CPT

## 2024-02-13 PROCEDURE — 3074F SYST BP LT 130 MM HG: CPT | Performed by: STUDENT IN AN ORGANIZED HEALTH CARE EDUCATION/TRAINING PROGRAM

## 2024-02-13 PROCEDURE — 36415 COLL VENOUS BLD VENIPUNCTURE: CPT

## 2024-02-13 PROCEDURE — 83519 RIA NONANTIBODY: CPT

## 2024-02-13 PROCEDURE — 84443 ASSAY THYROID STIM HORMONE: CPT

## 2024-02-13 PROCEDURE — 84439 ASSAY OF FREE THYROXINE: CPT

## 2024-02-13 PROCEDURE — 3078F DIAST BP <80 MM HG: CPT | Performed by: STUDENT IN AN ORGANIZED HEALTH CARE EDUCATION/TRAINING PROGRAM

## 2024-02-13 PROCEDURE — 84681 ASSAY OF C-PEPTIDE: CPT

## 2024-02-13 RX ORDER — INSULIN LISPRO 100 [IU]/ML
INJECTION, SOLUTION INTRAVENOUS; SUBCUTANEOUS
Qty: 15 ML | Refills: 3 | Status: SHIPPED | OUTPATIENT
Start: 2024-02-13

## 2024-02-13 RX ORDER — CHLORPHENIR/PHENYLEPH/ASPIRIN 2-7.8-325
1 TABLET, EFFERVESCENT ORAL AS NEEDED
Qty: 50 EACH | Refills: 3 | Status: SHIPPED | OUTPATIENT
Start: 2024-02-13

## 2024-02-13 RX ORDER — BLOOD-GLUCOSE SENSOR
EACH MISCELLANEOUS
Qty: 2 EACH | Refills: 11 | Status: SHIPPED | OUTPATIENT
Start: 2024-02-13

## 2024-02-13 RX ORDER — INSULIN GLARGINE 100 [IU]/ML
INJECTION, SOLUTION SUBCUTANEOUS
Qty: 15 ML | Refills: 3 | Status: SHIPPED | OUTPATIENT
Start: 2024-02-13

## 2024-02-13 RX ORDER — BLOOD SUGAR DIAGNOSTIC
100 STRIP MISCELLANEOUS DAILY
Qty: 100 EACH | Refills: 2 | Status: SHIPPED | OUTPATIENT
Start: 2024-02-13

## 2024-02-13 RX ORDER — PEN NEEDLE, DIABETIC 30 GX3/16"
NEEDLE, DISPOSABLE MISCELLANEOUS
Qty: 100 EACH | Refills: 3 | Status: SHIPPED | OUTPATIENT
Start: 2024-02-13

## 2024-02-13 NOTE — PATIENT INSTRUCTIONS
Schedule with the nutritionist     Referral: maternal fetal medicine    Glargine 20 units at bedtime   Mealtime insulin 7 with meals and a sliding scale     Graciela Cui MD  Divison of Endocrinology   [facility]   Phone: [phone]    option 4, then option 1  Fax: [phone]

## 2024-02-13 NOTE — PROGRESS NOTES
28 F PMH: DM, goiter/thyroid nodule     DM2 vs JOHN  Low C peptide initially but on subsequent testing in 2018 showing it is detectable, STARR and IA1 ab negative     Interval: had routine check up and was found to have positive pregnancy test 2 weeks ago.  She ran out of lantus since Saturday   Sugars are much improved on a consistent regimen, labs ordered previously are pending    Diabetes History     DM diagnosed around 2014  Complications Micro and Macro-albuminuria   A1c:   Lab Results   Component Value Date    HGBA1C 11.4 (A) 10/31/2022   10/19/2023 14%     Regimen   Lantus 20  Prandials 6-6-6 with sliding scale 1:50 >150     SMBG   Has a  shanika 3   Hypoglycemia none     Diet: non carb restricted     Comorbidities and Screening  Eye Exam: needs  Foot exam due    Lipid  Lab Results   Component Value Date    LDLF 69 12/14/2021    TRIG 243 (H) 12/14/2021         Statin- none  Cr and albuminuria- +albuminuria   Lab Results   Component Value Date    CREATININE 0.59 05/03/2023      ACE/ARB- none     2)  THYROID:   Had FNA of 1.7cm right nodule- non diagnostic 8/2021  Then repeated in 10/2021 and was a benign follicular nodule     Ultrasound done in 11/2023-right nodule is stable in size       Past Medical History:   Diagnosis Date    Encounter for screening for infections with a predominantly sexual mode of transmission 01/15/2019    Routine screening for STI (sexually transmitted infection)    Encounter for surveillance of injectable contraceptive 01/14/2021    Encounter for Depo-Provera contraception    Other conditions influencing health status 01/17/2017    Uncontrolled type 2 diabetes mellitus    Other problems related to lifestyle 01/04/2016    Other problems related to lifestyle    Other specified health status 04/12/2019    No pertinent past medical history    Type 2 diabetes mellitus without complications (CMS/HCC) 10/27/2020    Diabetes type 2, controlled     Family History   Problem Relation Name Age of  Onset    Diabetes type II Mother      Diabetes type II Mother's Brother      Diabetes type II Maternal Grandmother      Other (Autoimmune disor) Other        Social History     Socioeconomic History    Marital status: Single     Spouse name: Not on file    Number of children: Not on file    Years of education: Not on file    Highest education level: Not on file   Occupational History    Not on file   Tobacco Use    Smoking status: Never     Passive exposure: Never    Smokeless tobacco: Never   Substance and Sexual Activity    Alcohol use: Never    Drug use: Never    Sexual activity: Not on file   Other Topics Concern    Not on file   Social History Narrative    Not on file     Social Determinants of Health     Financial Resource Strain: Not on file   Food Insecurity: Not on file   Transportation Needs: Not on file   Physical Activity: Not on file   Stress: Not on file   Social Connections: Not on file   Intimate Partner Violence: Not on file   Housing Stability: Not on file        ROS:  No polyuria polydipsia  Weight stable   Negative except those noted in current and interim history    Physical Exam  Constitutional:       Appearance: Normal appearance.   Cardiovascular:      Rate and Rhythm: Normal rate and regular rhythm.   Skin:     Comments: No lipodystrophy    Neurological:      General: No focal deficit present.      Mental Status: She is alert and oriented to person, place, and time.   Psychiatric:         Mood and Affect: Mood normal.         Behavior: Behavior normal.          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), managed as type 2 (CMS/[facility])        Relevant Medications    insulin glargine (Lantus) 100 unit/mL (3 mL) pen    insulin lispro (HumaLOG) 100 unit/mL injection    blood-glucose sensor (FreeStyle Marquez 3 Sensor) device    acetone, urine, test  "(Ketone Urine Test) strip    pen needle, diabetic 31 gauge x 5/16\" needle    OneTouch Ultra Test strip    Other Relevant Orders    Albumin , Urine Random    Hemoglobin A1C    Lipid Panel    Renal Function Panel    Thyroxine, Free    Thyroid Stimulating Hormone    C-Peptide    STARR    Referral to Maternal Fetal Medicine    Beta Hydroxybutyrate          DM1 vs 2  She is lean, no physical features of insulin resistance but antibodies negative with a detectable c peptide back in 2018     Since she is pregnant and could possibly be a type 1 diabetic, ran out of lantus since sat we will check labs now and rule out DKA   -will also send for MFM referral     continue lantus 20 units daily  Lispro 7-7-7with sliding scale 1:50 >150    Pending consults:  Nutrition consult -for carb counting  Ophtha consult    CGM reviewed, minimum of 72 hrs of data reviewed, CGM data reviewed to influence glucose treatment plan   On libr[place] acct name: jodi gore  Target 68% high 32% low 0%, was high last 3 days due to running out of long actin      Thyroid nodule:  Repeat thyroid ultrasound in   Ultrasound done in 11/2023-right nodule is stable in size can in one year       Follow up with MFM for DM during pregnancy then can see me post delivery     "

## 2024-02-14 ENCOUNTER — INITIAL PRENATAL (OUTPATIENT)
Dept: OBSTETRICS AND GYNECOLOGY | Facility: CLINIC | Age: 29
End: 2024-02-14
Payer: COMMERCIAL

## 2024-02-14 VITALS — DIASTOLIC BLOOD PRESSURE: 69 MMHG | SYSTOLIC BLOOD PRESSURE: 106 MMHG | BODY MASS INDEX: 24.07 KG/M2 | WEIGHT: 140.2 LBS

## 2024-02-14 DIAGNOSIS — O24.019 PRE-EXISTING TYPE 1 DIABETES MELLITUS DURING PREGNANCY, ANTEPARTUM (HHS-HCC): ICD-10-CM

## 2024-02-14 DIAGNOSIS — O34.219 HISTORY OF CESAREAN SECTION COMPLICATING PREGNANCY (HHS-HCC): ICD-10-CM

## 2024-02-14 DIAGNOSIS — O09.90 HIGH RISK PREGNANCY, ANTEPARTUM (HHS-HCC): Primary | ICD-10-CM

## 2024-02-14 DIAGNOSIS — Z3A.01 7 WEEKS GESTATION OF PREGNANCY (HHS-HCC): ICD-10-CM

## 2024-02-14 LAB
C PEPTIDE SERPL-MCNC: 2.8 NG/ML (ref 0.7–3.9)
POC BLOOD, URINE: NEGATIVE
POC GLUCOSE, URINE: NEGATIVE MG/DL
POC KETONES, URINE: NEGATIVE MG/DL
POC LEUKOCYTES, URINE: NEGATIVE
POC NITRITE,URINE: NEGATIVE
POC PROTEIN, URINE: ABNORMAL MG/DL
PREGNANCY TEST URINE, POC: POSITIVE

## 2024-02-14 PROCEDURE — 99215 OFFICE O/P EST HI 40 MIN: CPT | Performed by: OBSTETRICS & GYNECOLOGY

## 2024-02-14 PROCEDURE — 87800 DETECT AGNT MULT DNA DIREC: CPT

## 2024-02-14 PROCEDURE — 81025 URINE PREGNANCY TEST: CPT | Performed by: OBSTETRICS & GYNECOLOGY

## 2024-02-14 PROCEDURE — 87086 URINE CULTURE/COLONY COUNT: CPT

## 2024-02-14 PROCEDURE — 81003 URINALYSIS AUTO W/O SCOPE: CPT | Performed by: OBSTETRICS & GYNECOLOGY

## 2024-02-14 ASSESSMENT — ENCOUNTER SYMPTOMS
EYES NEGATIVE: 0
ENDOCRINE NEGATIVE: 0
ALLERGIC/IMMUNOLOGIC NEGATIVE: 0
MUSCULOSKELETAL NEGATIVE: 0
RESPIRATORY NEGATIVE: 0
PSYCHIATRIC NEGATIVE: 0
CONSTITUTIONAL NEGATIVE: 0
CARDIOVASCULAR NEGATIVE: 0
NEUROLOGICAL NEGATIVE: 0
GASTROINTESTINAL NEGATIVE: 0
HEMATOLOGIC/LYMPHATIC NEGATIVE: 0

## 2024-02-14 ASSESSMENT — PATIENT HEALTH QUESTIONNAIRE - PHQ9
1. LITTLE INTEREST OR PLEASURE IN DOING THINGS: NOT AT ALL
SUM OF ALL RESPONSES TO PHQ9 QUESTIONS 1 AND 2: 0
2. FEELING DOWN, DEPRESSED OR HOPELESS: NOT AT ALL

## 2024-02-14 NOTE — RESULT ENCOUNTER NOTE
Has residual pancreatic function, STARR still pending, if negative then after pregnancy will introduce orals and try to wean off insulin

## 2024-02-15 LAB
BACTERIA UR CULT: NORMAL
C TRACH RRNA SPEC QL NAA+PROBE: NEGATIVE
N GONORRHOEA DNA SPEC QL PROBE+SIG AMP: NEGATIVE

## 2024-02-16 LAB — GAD65 AB SER IA-ACNC: <5 IU/ML (ref 0–5)

## 2024-02-19 RX ORDER — TOPIRAMATE 25 MG/1
TABLET ORAL
Qty: 588 TABLET | Refills: 1 | Status: SHIPPED | OUTPATIENT
Start: 2024-02-19

## 2024-02-21 ENCOUNTER — HOSPITAL ENCOUNTER (OUTPATIENT)
Dept: RADIOLOGY | Facility: HOSPITAL | Age: 29
Discharge: HOME | End: 2024-02-21
Payer: COMMERCIAL

## 2024-02-21 DIAGNOSIS — O36.80X0 PREGNANCY WITH INCONCLUSIVE FETAL VIABILITY (HHS-HCC): ICD-10-CM

## 2024-02-21 DIAGNOSIS — O02.1 MISSED ABORTION (HHS-HCC): Primary | ICD-10-CM

## 2024-02-21 PROCEDURE — 76815 OB US LIMITED FETUS(S): CPT | Performed by: OBSTETRICS & GYNECOLOGY

## 2024-02-21 PROCEDURE — 76817 TRANSVAGINAL US OBSTETRIC: CPT

## 2024-02-21 PROCEDURE — 76817 TRANSVAGINAL US OBSTETRIC: CPT | Performed by: OBSTETRICS & GYNECOLOGY

## 2024-02-21 PROCEDURE — 76801 OB US < 14 WKS SINGLE FETUS: CPT

## 2024-02-21 RX ORDER — MISOPROSTOL 200 UG/1
800 TABLET ORAL ONCE
Qty: 8 TABLET | Refills: 0 | Status: SHIPPED | OUTPATIENT
Start: 2024-02-21 | End: 2024-02-27 | Stop reason: WASHOUT

## 2024-02-21 RX ORDER — IBUPROFEN 600 MG/1
600 TABLET ORAL EVERY 6 HOURS PRN
Qty: 15 TABLET | Refills: 0 | Status: SHIPPED | OUTPATIENT
Start: 2024-02-21 | End: 2024-02-27 | Stop reason: WASHOUT

## 2024-02-21 NOTE — PROGRESS NOTES
I called the patient and reviewed her ultrasound results, which showed a missed Ab given today's results in comparison to her ultrasound on 2/6/2024. We reviewed options for management, including conservative follow up, Misoprostol, MVA, or D&C.  Pt desired to proceed with Misoprostol.  Administration reviewed as well as bleeding expectations; pt informed to call or come in for evaluation if bleeding exceeds saturating 2 pads/hour for over two consecutive hours.  All questions were answered. Will arrange follow up ultrasound.

## 2024-02-27 ENCOUNTER — OFFICE VISIT (OUTPATIENT)
Dept: OBSTETRICS AND GYNECOLOGY | Facility: CLINIC | Age: 29
End: 2024-02-27
Payer: COMMERCIAL

## 2024-02-27 DIAGNOSIS — O24.019 PRE-EXISTING TYPE 1 DIABETES MELLITUS DURING PREGNANCY, ANTEPARTUM (HHS-HCC): ICD-10-CM

## 2024-02-27 DIAGNOSIS — O02.1 MISSED ABORTION (HHS-HCC): Primary | ICD-10-CM

## 2024-02-27 PROBLEM — O09.90 HIGH RISK PREGNANCY, ANTEPARTUM (HHS-HCC): Status: RESOLVED | Noted: 2024-02-14 | Resolved: 2024-02-27

## 2024-02-27 PROCEDURE — 3051F HG A1C>EQUAL 7.0%<8.0%: CPT | Performed by: OBSTETRICS & GYNECOLOGY

## 2024-02-27 PROCEDURE — 3060F POS MICROALBUMINURIA REV: CPT | Performed by: OBSTETRICS & GYNECOLOGY

## 2024-02-27 PROCEDURE — 76857 US EXAM PELVIC LIMITED: CPT | Performed by: OBSTETRICS & GYNECOLOGY

## 2024-02-27 PROCEDURE — 3048F LDL-C <100 MG/DL: CPT | Performed by: OBSTETRICS & GYNECOLOGY

## 2024-02-27 PROCEDURE — 1036F TOBACCO NON-USER: CPT | Performed by: OBSTETRICS & GYNECOLOGY

## 2024-02-27 PROCEDURE — 99214 OFFICE O/P EST MOD 30 MIN: CPT | Performed by: OBSTETRICS & GYNECOLOGY

## 2024-02-27 RX ORDER — BLOOD-GLUCOSE TRANSMITTER
EACH MISCELLANEOUS
COMMUNITY
Start: 2024-02-12

## 2024-02-27 RX ORDER — VALACYCLOVIR HYDROCHLORIDE 500 MG/1
TABLET, FILM COATED ORAL
COMMUNITY
Start: 2023-11-05

## 2024-02-27 RX ORDER — FLASH GLUCOSE SENSOR
KIT MISCELLANEOUS
COMMUNITY
Start: 2023-11-30

## 2024-02-27 RX ORDER — INSULIN PMP CART,AUT,G6/7,CNTR
EACH SUBCUTANEOUS
COMMUNITY
Start: 2024-02-08

## 2024-02-27 RX ORDER — DICLOFENAC SODIUM 10 MG/G
GEL TOPICAL
COMMUNITY

## 2024-02-27 RX ORDER — PEN NEEDLE, DIABETIC 32GX 5/32"
NEEDLE, DISPOSABLE MISCELLANEOUS
COMMUNITY
Start: 2024-01-08

## 2024-02-27 RX ORDER — ACYCLOVIR 400 MG/1
400 TABLET ORAL 3 TIMES DAILY
COMMUNITY
Start: 2023-09-21 | End: 2023-10-01

## 2024-02-27 RX ORDER — PEN NEEDLE, DIABETIC 31 GX5/16"
NEEDLE, DISPOSABLE MISCELLANEOUS
COMMUNITY
Start: 2024-02-13

## 2024-02-27 RX ORDER — ACETAMINOPHEN 500 MG
1 TABLET ORAL EVERY 6 HOURS PRN
COMMUNITY
Start: 2024-02-06

## 2024-02-27 NOTE — PROGRESS NOTES
SUBJECTIVE    28 y.o.  Recent pregnancy female presents for   Chief Complaint   Patient presents with    Follow-up     Pt is here for follow up re miscarriage.  Complains of pain 4/5  since yesterday.  BP: 110/76.   Weight:  143 lbs.  Accepts chaperone.   Hanny Muir LPN        Pt presents for miscarriage follow up.  On  had ultrasound confirming MAB with gestational sac ~7 weeks with yolk sac and no fetal pole.  She opted for Misoprostol, taking 800ug on .  After the medication she had cramping and passed both tissue and blood clots. Since then her bleeding has continued although it has lightened over time.  Now currently using ~3-4 pads/day.    OB/GYN History  Patient's last menstrual period was 2023 (approximate).    Social History     Substance and Sexual Activity   Sexual Activity Not on file         OB History    Para Term  AB Living   4 1 1   3 1   SAB IAB Ectopic Multiple Live Births   2 1     1      # Outcome Date GA Lbr Curtis/2nd Weight Sex Delivery Anes PTL Lv   4 SAB      Incomplete M      3 IAB            2 Term 2019   3.572 kg  CS-LTranv   DEJAN      Complications: Preeclampsia   1 SAB                Past Medical History  She has a past medical history of Encounter for screening for infections with a predominantly sexual mode of transmission (01/15/2019), Encounter for surveillance of injectable contraceptive (2021), Other conditions influencing health status (2017), Other problems related to lifestyle (2016), Other specified health status (2019), and Type 2 diabetes mellitus without complications (CMS/HCC) (10/27/2020).    Surgical History  She has a past surgical history that includes Other surgical history (2016); Other surgical history (10/27/2020); and US guided thyroid biopsy (10/4/2021).     Social History  She reports that she has never smoked. She has never been exposed to tobacco smoke. She has never used smokeless  tobacco. She reports that she does not drink alcohol and does not use drugs.    Screenings  Social Determinants of Health     Intimate Partner Violence: Not on file   Social Connections: Not on file   Alcohol Use: Not on file   Tobacco Use: Low Risk  (2024)    Patient History     Smoking Tobacco Use: Never     Smokeless Tobacco Use: Never     Passive Exposure: Never   Financial Resource Strain: Not on file   Depression: Not at risk (2024)    PHQ-2     PHQ-2 Score: 0   Postpartum Depression: Not on file   Stress: Not on file   Physical Activity: Not on file   Food Insecurity: Not on file   Transportation Needs: Not on file         OBJECTIVE  There were no vitals filed for this visit.  There is no height or weight on file to calculate BMI.     Chaperone: Present  OBGyn Exam    TVUS performed for miscarriage follow up.  Uterus measures 8.7 x 4.45 x 5.14 cm with no gestational sac and endometrium 1.35cm.      Immunization History   Administered Date(s) Administered    Flu vaccine, quadrivalent, no egg protein, age 6 month or greater (FLUCELVAX) 10/30/2019, 10/30/2022    HPV 9-valent vaccine (GARDASIL 9) 2018    HPV, Quadrivalent 2007, 09/10/2009, 2010    Hep A / Hep B 07/10/2015    Hepatitis A vaccine, pediatric/adolescent (HAVRIX, VAQTA) 2013, 2013    Hepatitis B vaccine, adult (RECOMBIVAX, ENGERIX) 2021    Influenza Whole 2007    Influenza, live, intranasal 2010, 2013, 2013    Meningococcal ACWY vaccine (MENVEO) 2012    Meningococcal MCV4P 2007    PPD Test 2022    Pfizer Purple Cap SARS-CoV-2 2021, 10/19/2021    Tdap vaccine, age 7 year and older (BOOSTRIX, ADACEL) 2007, 2015, 2019        ASSESSMENT & PLAN  Problem List Items Addressed This Visit       Pre-existing type 1 diabetes mellitus during pregnancy, antepartum     Other Visit Diagnoses       Missed     -  Primary            Follow up: TVUS  c/w complete expulsuion of sac. Bleeding precautions reviewed. Pt declines contraception.  Discussed importance of good glycemic control prior to attempting next conception.    Misbah Salazar MD  Obstetrics & Gynecology  02/27/24

## 2024-02-29 ENCOUNTER — APPOINTMENT (OUTPATIENT)
Dept: MATERNAL FETAL MEDICINE | Facility: CLINIC | Age: 29
End: 2024-02-29
Payer: COMMERCIAL

## 2024-03-02 ENCOUNTER — HOSPITAL ENCOUNTER (EMERGENCY)
Facility: HOSPITAL | Age: 29
Discharge: HOME | End: 2024-03-02
Attending: EMERGENCY MEDICINE
Payer: COMMERCIAL

## 2024-03-02 VITALS
HEART RATE: 66 BPM | BODY MASS INDEX: 24.75 KG/M2 | RESPIRATION RATE: 14 BRPM | HEIGHT: 64 IN | DIASTOLIC BLOOD PRESSURE: 74 MMHG | TEMPERATURE: 98.1 F | WEIGHT: 145 LBS | SYSTOLIC BLOOD PRESSURE: 125 MMHG | OXYGEN SATURATION: 100 %

## 2024-03-02 DIAGNOSIS — R05.1 ACUTE COUGH: Primary | ICD-10-CM

## 2024-03-02 LAB
FLUAV RNA RESP QL NAA+PROBE: NOT DETECTED
FLUBV RNA RESP QL NAA+PROBE: NOT DETECTED
RSV RNA RESP QL NAA+PROBE: NOT DETECTED
SARS-COV-2 RNA RESP QL NAA+PROBE: NOT DETECTED

## 2024-03-02 PROCEDURE — 87637 SARSCOV2&INF A&B&RSV AMP PRB: CPT

## 2024-03-02 PROCEDURE — 99284 EMERGENCY DEPT VISIT MOD MDM: CPT | Performed by: EMERGENCY MEDICINE

## 2024-03-02 PROCEDURE — 99283 EMERGENCY DEPT VISIT LOW MDM: CPT

## 2024-03-02 PROCEDURE — 2500000001 HC RX 250 WO HCPCS SELF ADMINISTERED DRUGS (ALT 637 FOR MEDICARE OP): Mod: SE

## 2024-03-02 RX ORDER — DEXTROMETHORPHAN POLISTIREX 30 MG/5ML
60 SUSPENSION ORAL 2 TIMES DAILY
Qty: 89 ML | Refills: 0 | Status: SHIPPED | OUTPATIENT
Start: 2024-03-02 | End: 2024-03-12

## 2024-03-02 RX ORDER — GUAIFENESIN/DEXTROMETHORPHAN 100-10MG/5
10 SYRUP ORAL ONCE
Status: COMPLETED | OUTPATIENT
Start: 2024-03-02 | End: 2024-03-02

## 2024-03-02 RX ORDER — BENZONATATE 100 MG/1
100 CAPSULE ORAL ONCE
Status: COMPLETED | OUTPATIENT
Start: 2024-03-02 | End: 2024-03-02

## 2024-03-02 RX ADMIN — BENZONATATE 100 MG: 100 CAPSULE ORAL at 16:42

## 2024-03-02 RX ADMIN — GUAIFENESIN AND DEXTROMETHORPHAN 10 ML: 100; 10 SYRUP ORAL at 16:42

## 2024-03-02 ASSESSMENT — COLUMBIA-SUICIDE SEVERITY RATING SCALE - C-SSRS
6. HAVE YOU EVER DONE ANYTHING, STARTED TO DO ANYTHING, OR PREPARED TO DO ANYTHING TO END YOUR LIFE?: NO
1. IN THE PAST MONTH, HAVE YOU WISHED YOU WERE DEAD OR WISHED YOU COULD GO TO SLEEP AND NOT WAKE UP?: NO
2. HAVE YOU ACTUALLY HAD ANY THOUGHTS OF KILLING YOURSELF?: NO

## 2024-03-02 ASSESSMENT — PAIN - FUNCTIONAL ASSESSMENT: PAIN_FUNCTIONAL_ASSESSMENT: 0-10

## 2024-03-02 ASSESSMENT — PAIN SCALES - GENERAL: PAINLEVEL_OUTOF10: 0 - NO PAIN

## 2024-03-02 NOTE — ED PROVIDER NOTES
HPI   Chief Complaint   Patient presents with    Cough     Reports cough x1 week       HPI     Patient is a 28-year-old female with past medical history significant for T1DM presenting to the emergency department for 1 week history of cough. History is collected from patient. Patient states that for the past week, she has had a persistent cough that has been nonproductive. She has no sick contacts at home. She has a child at home who currently has diarrhea but no cough like symptoms. 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 She otherwise feels well with some mild fatigue. No weight loss. No fevers at home. No nausea, emesis, or diarrhea. No abdominal pain. No history of asthma. No increased respiratory efforts. She denies allergies or any other past medical history.               [place] Coma Scale Score: 15                     Patient History   Past Medical History:   Diagnosis Date    Encounter for screening for infections with a predominantly sexual mode of transmission 01/15/2019    Routine screening for STI (sexually transmitted infection)    Encounter for surveillance of injectable contraceptive 2021    Encounter for Depo-Provera contraception    Other conditions influencing health status 2017    Uncontrolled type 2 diabetes mellitus    Other problems related to lifestyle 2016    Other problems related to lifestyle    Other specified health status 2019    No pertinent past medical history    Type 2 diabetes mellitus without complications (CMS/HCC) 10/27/2020    Diabetes type 2, controlled     Past Surgical History:   Procedure Laterality Date    OTHER SURGICAL HISTORY  2016    Dental Surgery    OTHER SURGICAL HISTORY  10/27/2020     section    US GUIDED THYROID BIOPSY  10/4/2021    US GUIDED THYROID BIOPSY 10/4/2021 [facility]     Family History   Problem Relation Name Age of Onset     Diabetes type II Mother      Diabetes type II Mother's Brother      Diabetes type II Maternal Grandmother      Other (Autoimmune disor) Other       Social History     Tobacco Use    Smoking status: Never     Passive exposure: Never    Smokeless tobacco: Never   Substance Use Topics    Alcohol use: Never    Drug use: Never       Physical Exam   ED Triage Vitals [03/02/24 1507]   Temperature Heart Rate Respirations BP   36.7 °C (98.1 °F) 66 14 125/74      Pulse Ox Temp Source Heart Rate Source Patient Position   100 % Temporal Monitor Sitting      BP Location FiO2 (%)     Right arm --       Physical Exam  Vitals and nursing note reviewed.   Constitutional:       Appearance: Normal appearance.   HENT:      Head: Normocephalic and atraumatic.      Nose: No rhinorrhea.      Mouth/Throat:      Mouth: Mucous membranes are moist.      Pharynx: Oropharynx is clear. No oropharyngeal exudate or posterior oropharyngeal erythema.   Eyes:      General: No scleral icterus.     Extraocular Movements: Extraocular movements intact.      Conjunctiva/sclera: Conjunctivae normal.      Pupils: Pupils are equal, round, and reactive to light.   Cardiovascular:      Rate and Rhythm: Normal rate and regular rhythm.      Pulses: Normal pulses.      Heart sounds: Normal heart sounds. No murmur heard.  Pulmonary:      Effort: Pulmonary effort is normal.      Breath sounds: Normal breath sounds. No wheezing, rhonchi or rales.      Comments: Persistently coughing throughout exam. Nonproductive. Breath sounds otherwise normal.  Abdominal:      General: Abdomen is flat.      Palpations: Abdomen is soft. There is no mass.      Tenderness: There is no abdominal tenderness. There is no guarding or rebound.   Musculoskeletal:         General: No swelling, tenderness, deformity or signs of injury. Normal range of motion.      Cervical back: Normal range of motion and neck supple.   Skin:     General: Skin is warm and dry.      Capillary Refill:  Capillary refill takes less than 2 seconds.      Coloration: Skin is not jaundiced.      Findings: No rash.   Neurological:      General: No focal deficit present.      Mental Status: She is alert and oriented to person, place, and time.         ED Course & MDM   Diagnoses as of 03/02/24 1644   Acute cough       Medical Decision Making  Patient is a 28-year-old female with past medical history significant for T1DM. On presentation, patient is hemodynamically stable and overall well-appearing. Her overall history seems less suspicious for high risks of cough including no fevers at home, no weight loss, normal p.o. intake, no chest or abdominal pain. No hemoptysis as well. Considered pertussis as potential etiology, but patient's cough is only been ongoing for 1 week and she has long intervals between coughing. Additionally, cough does not have the characteristic whooping quality or severity that 1 would expect with pertussis. Given that she has a kit at home who was concurrently sick, patient likely suffering from viral etiology of upper respiratory infection. Given that the rest of her exam is benign including no abdominal tenderness, nausea, emesis, or chest pain, feel that viral respiratory swab testing is adequate for further workup. For symptomatic control, patient given 10 mL of Robitussin as well as Tessalon Perles for antitussives. Medication was administered and patient informed that test results should be available on [facility] later but she was welcome to stay for results if she wished. Patient wishes to go home at this time and remains stable and oriented. As a result of the work-up, patient was discharged home.  They were informed of their diagnosis and instructed to come back with any concerns or worsening of condition and was agreeable to the plan as discussed above.  The patient was given the opportunity to ask questions.  All of the patient's questions were answered.  The patient remained stable under  my care.      Procedure  Procedures     Dominick Macias MD  Resident  03/02/24 [id number]

## 2024-04-13 ENCOUNTER — HOSPITAL ENCOUNTER (EMERGENCY)
Facility: HOSPITAL | Age: 29
Discharge: HOME | End: 2024-04-13
Payer: COMMERCIAL

## 2024-04-13 VITALS
WEIGHT: 140 LBS | SYSTOLIC BLOOD PRESSURE: 161 MMHG | TEMPERATURE: 97.7 F | RESPIRATION RATE: 18 BRPM | BODY MASS INDEX: 23.32 KG/M2 | DIASTOLIC BLOOD PRESSURE: 87 MMHG | OXYGEN SATURATION: 100 % | HEIGHT: 65 IN | HEART RATE: 76 BPM

## 2024-04-13 DIAGNOSIS — R11.2 NAUSEA AND VOMITING, UNSPECIFIED VOMITING TYPE: Primary | ICD-10-CM

## 2024-04-13 LAB
ALBUMIN SERPL BCP-MCNC: 4.6 G/DL (ref 3.4–5)
ALP SERPL-CCNC: 39 U/L (ref 33–110)
ALT SERPL W P-5'-P-CCNC: 14 U/L (ref 7–45)
ANION GAP BLDV CALCULATED.4IONS-SCNC: 10 MMOL/L (ref 10–25)
ANION GAP BLDV CALCULATED.4IONS-SCNC: 11 MMOL/L (ref 10–25)
ANION GAP SERPL CALC-SCNC: 15 MMOL/L (ref 10–20)
AST SERPL W P-5'-P-CCNC: 14 U/L (ref 9–39)
B-HCG SERPL-ACNC: 53 MIU/ML
B-OH-BUTYR SERPL-SCNC: 0.17 MMOL/L (ref 0.02–0.27)
BASE EXCESS BLDV CALC-SCNC: -0.3 MMOL/L (ref -2–3)
BASE EXCESS BLDV CALC-SCNC: -0.8 MMOL/L (ref -2–3)
BASOPHILS # BLD AUTO: 0.02 X10*3/UL (ref 0–0.1)
BASOPHILS NFR BLD AUTO: 0.4 %
BILIRUB SERPL-MCNC: 0.9 MG/DL (ref 0–1.2)
BODY TEMPERATURE: 37 DEGREES CELSIUS
BODY TEMPERATURE: 37 DEGREES CELSIUS
BUN SERPL-MCNC: 9 MG/DL (ref 6–23)
CA-I BLDV-SCNC: 1.19 MMOL/L (ref 1.1–1.33)
CA-I BLDV-SCNC: 1.24 MMOL/L (ref 1.1–1.33)
CALCIUM SERPL-MCNC: 9.2 MG/DL (ref 8.6–10.6)
CHLORIDE BLDV-SCNC: 103 MMOL/L (ref 98–107)
CHLORIDE BLDV-SCNC: 104 MMOL/L (ref 98–107)
CHLORIDE SERPL-SCNC: 104 MMOL/L (ref 98–107)
CO2 SERPL-SCNC: 24 MMOL/L (ref 21–32)
CREAT SERPL-MCNC: 0.75 MG/DL (ref 0.5–1.05)
EGFRCR SERPLBLD CKD-EPI 2021: >90 ML/MIN/1.73M*2
EOSINOPHIL # BLD AUTO: 0.03 X10*3/UL (ref 0–0.7)
EOSINOPHIL NFR BLD AUTO: 0.5 %
ERYTHROCYTE [DISTWIDTH] IN BLOOD BY AUTOMATED COUNT: 11 % (ref 11.5–14.5)
GLUCOSE BLDV-MCNC: 217 MG/DL (ref 74–99)
GLUCOSE BLDV-MCNC: 221 MG/DL (ref 74–99)
GLUCOSE SERPL-MCNC: 235 MG/DL (ref 74–99)
HCO3 BLDV-SCNC: 25.9 MMOL/L (ref 22–26)
HCO3 BLDV-SCNC: 26.2 MMOL/L (ref 22–26)
HCT VFR BLD AUTO: 34.5 % (ref 36–46)
HCT VFR BLD EST: 35 % (ref 36–46)
HCT VFR BLD EST: 38 % (ref 36–46)
HGB BLD-MCNC: 12.1 G/DL (ref 12–16)
HGB BLDV-MCNC: 11.5 G/DL (ref 12–16)
HGB BLDV-MCNC: 12.7 G/DL (ref 12–16)
IMM GRANULOCYTES # BLD AUTO: 0.06 X10*3/UL (ref 0–0.7)
IMM GRANULOCYTES NFR BLD AUTO: 1.1 % (ref 0–0.9)
INHALED O2 CONCENTRATION: 21 %
INHALED O2 CONCENTRATION: 21 %
LACTATE BLDV-SCNC: 1.7 MMOL/L (ref 0.4–2)
LACTATE BLDV-SCNC: 1.7 MMOL/L (ref 0.4–2)
LACTATE BLDV-SCNC: 2.3 MMOL/L (ref 0.4–2)
LIPASE SERPL-CCNC: 18 U/L (ref 9–82)
LYMPHOCYTES # BLD AUTO: 2.21 X10*3/UL (ref 1.2–4.8)
LYMPHOCYTES NFR BLD AUTO: 38.8 %
MCH RBC QN AUTO: 30.3 PG (ref 26–34)
MCHC RBC AUTO-ENTMCNC: 35.1 G/DL (ref 32–36)
MCV RBC AUTO: 86 FL (ref 80–100)
MONOCYTES # BLD AUTO: 0.39 X10*3/UL (ref 0.1–1)
MONOCYTES NFR BLD AUTO: 6.8 %
NEUTROPHILS # BLD AUTO: 2.99 X10*3/UL (ref 1.2–7.7)
NEUTROPHILS NFR BLD AUTO: 52.4 %
NRBC BLD-RTO: 0 /100 WBCS (ref 0–0)
OXYHGB MFR BLDV: 64.6 % (ref 45–75)
OXYHGB MFR BLDV: 74.3 % (ref 45–75)
PCO2 BLDV: 48 MM HG (ref 41–51)
PCO2 BLDV: 52 MM HG (ref 41–51)
PH BLDV: 7.31 PH (ref 7.33–7.43)
PH BLDV: 7.34 PH (ref 7.33–7.43)
PLATELET # BLD AUTO: 201 X10*3/UL (ref 150–450)
PO2 BLDV: 42 MM HG (ref 35–45)
PO2 BLDV: 49 MM HG (ref 35–45)
POTASSIUM BLDV-SCNC: 3.5 MMOL/L (ref 3.5–5.3)
POTASSIUM BLDV-SCNC: 3.9 MMOL/L (ref 3.5–5.3)
POTASSIUM SERPL-SCNC: 3.5 MMOL/L (ref 3.5–5.3)
PROT SERPL-MCNC: 7.7 G/DL (ref 6.4–8.2)
RBC # BLD AUTO: 4 X10*6/UL (ref 4–5.2)
SAO2 % BLDV: 66 % (ref 45–75)
SAO2 % BLDV: 76 % (ref 45–75)
SODIUM BLDV-SCNC: 136 MMOL/L (ref 136–145)
SODIUM BLDV-SCNC: 137 MMOL/L (ref 136–145)
SODIUM SERPL-SCNC: 139 MMOL/L (ref 136–145)
WBC # BLD AUTO: 5.7 X10*3/UL (ref 4.4–11.3)

## 2024-04-13 PROCEDURE — 83690 ASSAY OF LIPASE: CPT | Performed by: PHYSICIAN ASSISTANT

## 2024-04-13 PROCEDURE — 84075 ASSAY ALKALINE PHOSPHATASE: CPT | Performed by: PHYSICIAN ASSISTANT

## 2024-04-13 PROCEDURE — 96361 HYDRATE IV INFUSION ADD-ON: CPT

## 2024-04-13 PROCEDURE — 82010 KETONE BODYS QUAN: CPT | Performed by: PHYSICIAN ASSISTANT

## 2024-04-13 PROCEDURE — 36415 COLL VENOUS BLD VENIPUNCTURE: CPT | Performed by: PHYSICIAN ASSISTANT

## 2024-04-13 PROCEDURE — 96374 THER/PROPH/DIAG INJ IV PUSH: CPT

## 2024-04-13 PROCEDURE — 83605 ASSAY OF LACTIC ACID: CPT | Performed by: PHYSICIAN ASSISTANT

## 2024-04-13 PROCEDURE — 85025 COMPLETE CBC W/AUTO DIFF WBC: CPT | Performed by: PHYSICIAN ASSISTANT

## 2024-04-13 PROCEDURE — 96375 TX/PRO/DX INJ NEW DRUG ADDON: CPT

## 2024-04-13 PROCEDURE — 84702 CHORIONIC GONADOTROPIN TEST: CPT | Performed by: PHYSICIAN ASSISTANT

## 2024-04-13 PROCEDURE — 99284 EMERGENCY DEPT VISIT MOD MDM: CPT | Performed by: PHYSICIAN ASSISTANT

## 2024-04-13 PROCEDURE — 99284 EMERGENCY DEPT VISIT MOD MDM: CPT | Mod: 25

## 2024-04-13 PROCEDURE — 2500000004 HC RX 250 GENERAL PHARMACY W/ HCPCS (ALT 636 FOR OP/ED): Mod: SE | Performed by: PHYSICIAN ASSISTANT

## 2024-04-13 PROCEDURE — 84132 ASSAY OF SERUM POTASSIUM: CPT | Performed by: PHYSICIAN ASSISTANT

## 2024-04-13 RX ORDER — METOCLOPRAMIDE HYDROCHLORIDE 5 MG/ML
10 INJECTION INTRAMUSCULAR; INTRAVENOUS ONCE
Status: COMPLETED | OUTPATIENT
Start: 2024-04-13 | End: 2024-04-13

## 2024-04-13 RX ORDER — FAMOTIDINE 10 MG/ML
40 INJECTION INTRAVENOUS ONCE
Status: COMPLETED | OUTPATIENT
Start: 2024-04-13 | End: 2024-04-13

## 2024-04-13 RX ORDER — ONDANSETRON HYDROCHLORIDE 2 MG/ML
4 INJECTION, SOLUTION INTRAVENOUS ONCE
Status: COMPLETED | OUTPATIENT
Start: 2024-04-13 | End: 2024-04-13

## 2024-04-13 RX ORDER — ONDANSETRON 4 MG/1
4 TABLET, ORALLY DISINTEGRATING ORAL EVERY 8 HOURS PRN
Qty: 21 TABLET | Refills: 0 | Status: SHIPPED | OUTPATIENT
Start: 2024-04-13

## 2024-04-13 RX ADMIN — FAMOTIDINE 40 MG: 10 INJECTION INTRAVENOUS at 17:25

## 2024-04-13 RX ADMIN — ONDANSETRON 4 MG: 2 INJECTION INTRAMUSCULAR; INTRAVENOUS at 17:23

## 2024-04-13 RX ADMIN — METOCLOPRAMIDE 10 MG: 5 INJECTION, SOLUTION INTRAMUSCULAR; INTRAVENOUS at 17:50

## 2024-04-13 RX ADMIN — SODIUM CHLORIDE, POTASSIUM CHLORIDE, SODIUM LACTATE AND CALCIUM CHLORIDE 1000 ML: 600; 310; 30; 20 INJECTION, SOLUTION INTRAVENOUS at 17:26

## 2024-04-13 ASSESSMENT — COLUMBIA-SUICIDE SEVERITY RATING SCALE - C-SSRS
2. HAVE YOU ACTUALLY HAD ANY THOUGHTS OF KILLING YOURSELF?: NO
1. IN THE PAST MONTH, HAVE YOU WISHED YOU WERE DEAD OR WISHED YOU COULD GO TO SLEEP AND NOT WAKE UP?: NO
6. HAVE YOU EVER DONE ANYTHING, STARTED TO DO ANYTHING, OR PREPARED TO DO ANYTHING TO END YOUR LIFE?: NO

## 2024-04-13 ASSESSMENT — PAIN SCALES - GENERAL: PAINLEVEL_OUTOF10: 0 - NO PAIN

## 2024-04-13 ASSESSMENT — PAIN - FUNCTIONAL ASSESSMENT: PAIN_FUNCTIONAL_ASSESSMENT: 0-10

## 2024-04-13 NOTE — ED PROVIDER NOTES
"This is a 28-year-old female with a past medical history of insulin-dependent diabetes who presents to the ED with 1 day of nausea and vomiting.  She states her symptoms began after eating an edible.  She denies any coffee-ground emesis or bright red blood in her emesis.  She states that yesterday she had 1 episode of diarrhea that was also nonbloody.  She endorses having epigastric abdominal discomfort.  She does endorse associated generalized malaise and lightheadedness but denies any syncopal episodes.  She denies any at home for her symptoms.  She denies any other illicit substance use or alcohol use.  She states that she is compliant with her insulin for her diabetes.      History provided by:  Patient   used: No             Visit Vitals  /87   Pulse 76   Temp 36.5 °C (97.7 °F) (Temporal)   Resp 18   Ht 1.646 m (5' 4.8\")   Wt 63.5 kg (140 lb)   LMP 01/04/2023 (Approximate)   SpO2 100%   BMI 23.44 kg/m²   OB Status Recent pregnancy   Smoking Status Never   BSA 1.7 m²          Physical Exam     Physical exam:   General: Vitals noted, no distress. Afebrile.   EENT:  Hearing grossly intact. Normal phonation. MMM. Airway patient. PERRL. EOMI.   Neck: No midline tenderness or paraspinal tenderness. FROM.   Cardiac: Regular, rate, rhythm. Normal S1 and S2.  No murmurs, gallops, rubs.   Pulmonary: Good air exchange. Lungs clear bilaterally. No wheezes, rhonchi, rales. No accessory muscle use.   Abdomen: Soft, nonsurgical. Tender to palpation epigastric region without any rebound or guarding. No peritoneal signs. Normoactive bowel sounds.   Back: No CVA tenderness. No midline tenderness or paraspinal tenderness. No obvious deformity.   Extremities: No peripheral edema.  Full range of motion. Moves all extremities freely. No tenderness throughout extremities.   Skin: No rash. Warm and Dry.   Neuro: No focal neurologic deficits. CN 2-12 grossly intact. Sensation equal bilaterally. No weakness. "         Labs Reviewed   CBC WITH AUTO DIFFERENTIAL - Abnormal       Result Value    WBC 5.7      nRBC 0.0      RBC 4.00      Hemoglobin 12.1      Hematocrit 34.5 (*)     MCV 86      MCH 30.3      MCHC 35.1      RDW 11.0 (*)     Platelets 201      Neutrophils % 52.4      Immature Granulocytes %, Automated 1.1 (*)     Lymphocytes % 38.8      Monocytes % 6.8      Eosinophils % 0.5      Basophils % 0.4      Neutrophils Absolute 2.99      Immature Granulocytes Absolute, Automated 0.06      Lymphocytes Absolute 2.21      Monocytes Absolute 0.39      Eosinophils Absolute 0.03      Basophils Absolute 0.02     COMPREHENSIVE METABOLIC PANEL - Abnormal    Glucose 235 (*)     Sodium 139      Potassium 3.5      Chloride 104      Bicarbonate 24      Anion Gap 15      Urea Nitrogen 9      Creatinine 0.75      eGFR >90      Calcium 9.2      Albumin 4.6      Alkaline Phosphatase 39      Total Protein 7.7      AST 14      Bilirubin, Total 0.9      ALT 14     HUMAN CHORIONIC GONADOTROPIN, SERUM QUANTITATIVE - Abnormal    HCG, Beta-Quantitative 53 (*)     Narrative:     Total HCG measurement is performed using the Siemens My SourceboxllCylande immunoassay which detects intact HCG and free beta HCG subunit.  This test is not indicated for use as a tumor marker.  HCG testing is performed using a different test methodology at [facility] than other [facility]. Direct result comparison  should only be made within the same method.          BLOOD GAS VENOUS FULL PANEL - Abnormal    POCT pH, Venous 7.31 (*)     POCT pCO2, Venous 52 (*)     POCT pO2, Venous 42      POCT SO2, Venous 66      POCT Oxy Hemoglobin, Venous 64.6      POCT Hematocrit Calculated, Venous 38.0      POCT Sodium, Venous 136      POCT Potassium, Venous 3.5      POCT Chloride, Venous 103      POCT Ionized Calicum, Venous 1.24      POCT Glucose, Venous 221 (*)     POCT Lactate, Venous 2.3 (*)     POCT Base Excess, Venous -0.8      POCT HCO3 Calculated, Venous 26.2 (*)      POCT Hemoglobin, Venous 12.7      POCT Anion Gap, Venous 10.0      Patient Temperature 37.0      FiO2 21     BLOOD GAS VENOUS FULL PANEL - Abnormal    POCT pH, Venous 7.34      POCT pCO2, Venous 48      POCT pO2, Venous 49 (*)     POCT SO2, Venous 76 (*)     POCT Oxy Hemoglobin, Venous 74.3      POCT Hematocrit Calculated, Venous 35.0 (*)     POCT Sodium, Venous 137      POCT Potassium, Venous 3.9      POCT Chloride, Venous 104      POCT Ionized Calicum, Venous 1.19      POCT Glucose, Venous 217 (*)     POCT Lactate, Venous 1.7      POCT Base Excess, Venous -0.3      POCT HCO3 Calculated, Venous 25.9      POCT Hemoglobin, Venous 11.5 (*)     POCT Anion Gap, Venous 11.0      Patient Temperature 37.0      FiO2 21     LIPASE - Normal    Lipase 18      Narrative:     Venipuncture immediately after or during the administration of Metamizole may lead to falsely low results. Testing should be performed immediately prior to Metamizole dosing.   BETA HYDROXYBUTYRATE - Normal    Beta-Hydroxybutyrate 0.17      Narrative:     The beta-hydroxybutyrate test performance characteristics have been validated by  [facility]. This test has not been approved by the FDA; however such approval is not necessary.     BLOOD GAS LACTIC ACID, VENOUS - Normal    POCT Lactate, Venous 1.7     POCT GLUCOSE METER       No orders to display         ED Course & MDM     Medical Decision Making  This is a 28-year-old female with a past medical history of insulin-dependent diabetes who presents to the ED with nausea and vomiting as well as epigastric abdominal pain after eating an edible.  Vitals stable upon arrival to the ED.  On examination patient did have epigastric abdominal tenderness palpation.  Abdomen otherwise soft and nontender.  No CVA tenderness she did have an episode of emesis while I was in the room with her which was nonbloody and nonbilious.  No coffee-ground emesis.  IV established  laboratory studies obtained.  VBG did show patient was slightly acidotic with her pH being 7.31, pCO2 elevated 52, glucose 221 lactate elevated 2.3.  Normal anion gap at 10.  Based on the VBG she does not appear to be in DKA.  She was given 1 L of IV fluids as well as Zofran and Pepcid for her symptoms.  On reevaluation she was still having some vomiting and was ordered Reglan.  On reevaluation again she was feeling improved.  Repeat VBG gross unremarkable with a normal pH of 7.34, glucose 217, anion gap 11.  Lactate improved to 1.7.  CBC grossly unremarkable.  CMP also grossly unremarkable other than patient's elevated glucose at 235.  Lipase normal at 18.  Beta hydroxybutyrate 0.17.  Beta quant slightly elevated at 53, however patient recently had a miscarriage and is following with OB/GYN for the slightly residually positive beta quant and states it has been downtrending appropriately per her OB/GYN.  At this point in time patient was able to tolerate p.o. intake.  She was informed of all of her results.  She did have family members at bedside who felt comfortable taking her home at this point in time as well.  She was given signs and symptoms that she should return to the ED with.  She was given a note for her job and was discharged from the emergency department in stable condition.    Amount and/or Complexity of Data Reviewed  Labs: ordered.    Risk  Prescription drug management.         ED Course as of 04/13/24 1852   Sat Apr 13, 2024   1729 Blood Gas Venous Full Panel  VBG showed pH slightly low at 7.31.  Glucose 221.  Anion gap normal at 10.  Patient does not appear to be in DKA at this time.  Lactate was slightly elevated at 2.3.  pCO2 elevated 52. [AW]   1821 HCG, Beta-Quantitative(!): 53  Beta quant found to be 53.  Patient denied concern for pregnancy and stated she had a miscarriage 1 month ago and her beta quant has been downtrending appropriately.  No recent laboratories within our system to compare  this to.  She denied any pelvic pain or vaginal bleeding.  She was informed of this result and states that she is following with OB/GYN concerning the slightly elevated beta quant level [AW]   1834 On reevaluation she was feeling improved.  Family members came to bedside and stated that patient had been given an edible by a friend and they were unsure of the dosage.  Patient does not normally use any type of marijuana products [AW]      ED Course User Index  [AW] Joanna Wilcox PA-C         Diagnoses as of 04/13/24 1852   Nausea and vomiting, unspecified vomiting type       Procedures    ARMINDA Rico, PAFanyC     Joanna Wilcox PA-C  04/13/24 1852

## 2024-04-13 NOTE — Clinical Note
Sindy Rascon was seen and treated in our emergency department on 4/13/2024.  She may return to work on 04/14/2024.       If you have any questions or concerns, please don't hesitate to call.      Joanna Wilcox PA-C

## 2024-04-15 ENCOUNTER — TELEPHONE (OUTPATIENT)
Dept: OBSTETRICS AND GYNECOLOGY | Facility: CLINIC | Age: 29
End: 2024-04-15
Payer: COMMERCIAL

## 2024-04-15 DIAGNOSIS — O02.1 MISSED ABORTION (HHS-HCC): ICD-10-CM

## 2024-04-15 NOTE — TELEPHONE ENCOUNTER
Patient returned call  Identified by name and   Informed patient of Dr. Sarkar message and follow up.  Patient verbalized understanding, all questions and concerns addressed at this time.    MICHAEL GamezN RN

## 2024-04-15 NOTE — TELEPHONE ENCOUNTER
----- Message from Misbah Salazar MD sent at 4/15/2024 10:24 AM EDT -----  Regarding: FW: Pregnancy test positive but I had an miscarriage 6 weeks ago  Contact: [phone]  Please have her repeat a BhCG in 48 hours to see which way it is trending. It is likely just continued resolution of her miscarriage but we will want to make sure the levels are declining. I'll call her with results. Thanks!  ----- Message -----  From: Carie Zepeda RN  Sent: 4/15/2024   9:38 AM EDT  To: Misbah Salazar MD  Subject: FW: Pregnancy test positive but I had an mis#    Good Morning Dr. Salazar, could you advise on this patients message?  ----- Message -----  From: Sindy Rascon  Sent: 4/15/2024   9:33 AM EDT  To: [facility] [place] [address] [facility] Clinical Support Staff  Subject: Pregnancy test positive but I had an miscarr#    Hey , good morning I was contacting you because I had went to my clinic at [facility] && they said my pregnancy came back positive and they know I had an miscarriage last month they did blood work and my hcg levels was in the 50’s and I went to the ER and they said the same thing . They told me to contact my OBGYN to see what’s going on because they didn’t feel safe giving me the depo shot

## 2024-04-15 NOTE — TELEPHONE ENCOUNTER
Called patient to discuss follow up labs  Left vm for patient to return call to discuss next steps regarding elevated HCG levels.    MICHAEL GamezN RN

## 2024-04-25 ENCOUNTER — LAB (OUTPATIENT)
Dept: LAB | Facility: LAB | Age: 29
End: 2024-04-25
Payer: COMMERCIAL

## 2024-04-25 DIAGNOSIS — E13.9: ICD-10-CM

## 2024-04-25 DIAGNOSIS — O02.1 MISSED ABORTION (HHS-HCC): ICD-10-CM

## 2024-04-25 LAB
ALBUMIN SERPL BCP-MCNC: 4.3 G/DL (ref 3.4–5)
ANION GAP SERPL CALC-SCNC: 14 MMOL/L (ref 10–20)
B-HCG SERPL-ACNC: 4 MIU/ML
BUN SERPL-MCNC: 8 MG/DL (ref 6–23)
C PEPTIDE SERPL-MCNC: 2.2 NG/ML (ref 0.7–3.9)
CALCIUM SERPL-MCNC: 9.4 MG/DL (ref 8.6–10.6)
CHLORIDE SERPL-SCNC: 104 MMOL/L (ref 98–107)
CHOLEST SERPL-MCNC: 130 MG/DL (ref 0–199)
CHOLESTEROL/HDL RATIO: 2.9
CO2 SERPL-SCNC: 25 MMOL/L (ref 21–32)
CREAT SERPL-MCNC: 0.76 MG/DL (ref 0.5–1.05)
EGFRCR SERPLBLD CKD-EPI 2021: >90 ML/MIN/1.73M*2
EST. AVERAGE GLUCOSE BLD GHB EST-MCNC: 166 MG/DL
GLUCOSE SERPL-MCNC: 177 MG/DL (ref 74–99)
HBA1C MFR BLD: 7.4 %
HDLC SERPL-MCNC: 44.7 MG/DL
LDLC SERPL CALC-MCNC: 74 MG/DL
NON HDL CHOLESTEROL: 85 MG/DL (ref 0–149)
PHOSPHATE SERPL-MCNC: 3.9 MG/DL (ref 2.5–4.9)
POTASSIUM SERPL-SCNC: 4.1 MMOL/L (ref 3.5–5.3)
SODIUM SERPL-SCNC: 139 MMOL/L (ref 136–145)
TRIGL SERPL-MCNC: 56 MG/DL (ref 0–149)
VLDL: 11 MG/DL (ref 0–40)

## 2024-04-25 PROCEDURE — 36415 COLL VENOUS BLD VENIPUNCTURE: CPT

## 2024-04-25 PROCEDURE — 80061 LIPID PANEL: CPT

## 2024-04-25 PROCEDURE — 83519 RIA NONANTIBODY: CPT

## 2024-04-25 PROCEDURE — 80069 RENAL FUNCTION PANEL: CPT

## 2024-04-25 PROCEDURE — 84681 ASSAY OF C-PEPTIDE: CPT

## 2024-04-25 PROCEDURE — 83036 HEMOGLOBIN GLYCOSYLATED A1C: CPT

## 2024-04-25 PROCEDURE — 84702 CHORIONIC GONADOTROPIN TEST: CPT

## 2024-04-27 LAB — GAD65 AB SER IA-ACNC: <5 IU/ML (ref 0–5)

## 2024-06-06 ENCOUNTER — LAB (OUTPATIENT)
Dept: LAB | Facility: LAB | Age: 29
End: 2024-06-06
Payer: COMMERCIAL

## 2024-06-06 ENCOUNTER — CLINICAL SUPPORT (OUTPATIENT)
Dept: OBSTETRICS AND GYNECOLOGY | Facility: CLINIC | Age: 29
End: 2024-06-06
Payer: COMMERCIAL

## 2024-06-06 ENCOUNTER — TELEPHONE (OUTPATIENT)
Dept: OBSTETRICS AND GYNECOLOGY | Facility: CLINIC | Age: 29
End: 2024-06-06
Payer: COMMERCIAL

## 2024-06-06 ENCOUNTER — TELEPHONE (OUTPATIENT)
Dept: OBSTETRICS AND GYNECOLOGY | Facility: CLINIC | Age: 29
End: 2024-06-06

## 2024-06-06 DIAGNOSIS — N89.8 VAGINAL DISCHARGE: ICD-10-CM

## 2024-06-06 DIAGNOSIS — R39.9 UTI SYMPTOMS: ICD-10-CM

## 2024-06-06 PROCEDURE — 87086 URINE CULTURE/COLONY COUNT: CPT

## 2024-06-06 PROCEDURE — 87205 SMEAR GRAM STAIN: CPT

## 2024-06-06 NOTE — PROGRESS NOTES
Patient here to self swab  Identified by name and   Instructed patient on self-swab directions  Patient verbalized understanding, self-swab successfully obtained  No questions or concerns, sample given to MA to be scanned and sent to lab.    HANNAH Gamez RN

## 2024-06-06 NOTE — TELEPHONE ENCOUNTER
Patient returning call   Identified by name and   Patient states she is able to come in for appointment today for self swab   Scheduled patient for today at 4:00 pm for nurse visit, orders are in  No questions or concerns.    MICHAEL GamezN RN

## 2024-06-06 NOTE — TELEPHONE ENCOUNTER
Called patient to discuss symptoms and request for appointment  Identified by name and   Inquired about patient's symptoms. Patient states she is having brown discharge, thicker, lots of burning and itchiness, and using restroom a lot. Frequency and urgency noted   Going on for a couple of weeks now, brown discharge for about a month now, endorses some odor but unable to describe  Discussed with patient coming in to have urine culture and BV/Yeast swab done. Patient agreeable to this plan, will check with work to see if she can come in today and will reach back out if so, and RN will place patient on nurse schedule.  All questions and concerns addressed at this time.      MICHAEL GamezN RN

## 2024-06-07 LAB
BACTERIA UR CULT: NORMAL
CLUE CELLS VAG LPF-#/AREA: ABNORMAL /[LPF]
NUGENT SCORE: 0
YEAST VAG WET PREP-#/AREA: PRESENT

## 2024-06-10 ENCOUNTER — TELEPHONE (OUTPATIENT)
Dept: OBSTETRICS AND GYNECOLOGY | Facility: CLINIC | Age: 29
End: 2024-06-10
Payer: COMMERCIAL

## 2024-06-10 DIAGNOSIS — N89.8 VAGINAL DISCHARGE: ICD-10-CM

## 2024-06-10 RX ORDER — FLUCONAZOLE 150 MG/1
150 TABLET ORAL ONCE
Qty: 1 TABLET | Refills: 0 | Status: SHIPPED | OUTPATIENT
Start: 2024-06-10 | End: 2024-06-10

## 2024-06-10 NOTE — TELEPHONE ENCOUNTER
Called patient to discuss results  Identified by name and   Informed patient of results and medication being sent to pharmacy  Patient verbalized understanding, no questions or concerns at this time.    HANNAH Gamez RN      ----- Message from Misbah Salazar MD sent at 6/10/2024  8:10 AM EDT -----  Please call the patient regarding her abnormal result. She called with discharge-- please send her in Diflucan 150mg po x1 dose. Thanks!

## 2024-07-02 ENCOUNTER — CLINICAL SUPPORT (OUTPATIENT)
Dept: OBSTETRICS AND GYNECOLOGY | Facility: CLINIC | Age: 29
End: 2024-07-02
Payer: COMMERCIAL

## 2024-07-02 DIAGNOSIS — N89.8 VAGINAL ITCHING: Primary | ICD-10-CM

## 2024-07-02 PROCEDURE — 87205 SMEAR GRAM STAIN: CPT

## 2024-07-03 DIAGNOSIS — B37.31 YEAST INFECTION INVOLVING THE VAGINA AND SURROUNDING AREA: Primary | ICD-10-CM

## 2024-07-03 LAB
CLUE CELLS VAG LPF-#/AREA: ABNORMAL /[LPF]
NUGENT SCORE: 3
YEAST VAG WET PREP-#/AREA: PRESENT

## 2024-07-03 RX ORDER — FLUCONAZOLE 150 MG/1
150 TABLET ORAL ONCE
Qty: 2 TABLET | Refills: 0 | Status: SHIPPED | OUTPATIENT
Start: 2024-07-03 | End: 2024-07-03

## 2024-07-09 DIAGNOSIS — E13.9: ICD-10-CM

## 2024-07-09 RX ORDER — INSULIN LISPRO 100 [IU]/ML
INJECTION, SOLUTION INTRAVENOUS; SUBCUTANEOUS
Qty: 15 ML | Refills: 3 | Status: SHIPPED | OUTPATIENT
Start: 2024-07-09

## 2024-08-09 NOTE — PROGRESS NOTES
Pt is here to do a self swab.  She has complaints of vaginal itching.  Pt given instructions on how to self swab.  Specimen obtained by patient.  Hanny Muir LPN

## 2024-08-21 ENCOUNTER — APPOINTMENT (OUTPATIENT)
Dept: ENDOCRINOLOGY | Facility: CLINIC | Age: 29
End: 2024-08-21
Payer: COMMERCIAL

## 2024-08-21 VITALS
WEIGHT: 134 LBS | HEIGHT: 61 IN | BODY MASS INDEX: 25.3 KG/M2 | HEART RATE: 87 BPM | DIASTOLIC BLOOD PRESSURE: 86 MMHG | SYSTOLIC BLOOD PRESSURE: 116 MMHG

## 2024-08-21 DIAGNOSIS — Z79.4 TYPE 2 DIABETES MELLITUS WITH HYPERGLYCEMIA, WITH LONG-TERM CURRENT USE OF INSULIN (MULTI): Primary | ICD-10-CM

## 2024-08-21 DIAGNOSIS — E11.65 TYPE 2 DIABETES MELLITUS WITH HYPERGLYCEMIA, WITH LONG-TERM CURRENT USE OF INSULIN (MULTI): Primary | ICD-10-CM

## 2024-08-21 DIAGNOSIS — E13.9: ICD-10-CM

## 2024-08-21 LAB — POC HEMOGLOBIN A1C: 12.7 % (ref 4.2–6.5)

## 2024-08-21 PROCEDURE — 83036 HEMOGLOBIN GLYCOSYLATED A1C: CPT | Performed by: STUDENT IN AN ORGANIZED HEALTH CARE EDUCATION/TRAINING PROGRAM

## 2024-08-21 PROCEDURE — 3074F SYST BP LT 130 MM HG: CPT | Performed by: STUDENT IN AN ORGANIZED HEALTH CARE EDUCATION/TRAINING PROGRAM

## 2024-08-21 PROCEDURE — 3051F HG A1C>EQUAL 7.0%<8.0%: CPT | Performed by: STUDENT IN AN ORGANIZED HEALTH CARE EDUCATION/TRAINING PROGRAM

## 2024-08-21 PROCEDURE — 3079F DIAST BP 80-89 MM HG: CPT | Performed by: STUDENT IN AN ORGANIZED HEALTH CARE EDUCATION/TRAINING PROGRAM

## 2024-08-21 PROCEDURE — 3048F LDL-C <100 MG/DL: CPT | Performed by: STUDENT IN AN ORGANIZED HEALTH CARE EDUCATION/TRAINING PROGRAM

## 2024-08-21 PROCEDURE — 95251 CONT GLUC MNTR ANALYSIS I&R: CPT | Performed by: STUDENT IN AN ORGANIZED HEALTH CARE EDUCATION/TRAINING PROGRAM

## 2024-08-21 PROCEDURE — 3008F BODY MASS INDEX DOCD: CPT | Performed by: STUDENT IN AN ORGANIZED HEALTH CARE EDUCATION/TRAINING PROGRAM

## 2024-08-21 PROCEDURE — 1036F TOBACCO NON-USER: CPT | Performed by: STUDENT IN AN ORGANIZED HEALTH CARE EDUCATION/TRAINING PROGRAM

## 2024-08-21 PROCEDURE — 99214 OFFICE O/P EST MOD 30 MIN: CPT | Performed by: STUDENT IN AN ORGANIZED HEALTH CARE EDUCATION/TRAINING PROGRAM

## 2024-08-21 PROCEDURE — 3060F POS MICROALBUMINURIA REV: CPT | Performed by: STUDENT IN AN ORGANIZED HEALTH CARE EDUCATION/TRAINING PROGRAM

## 2024-08-21 RX ORDER — BLOOD-GLUCOSE SENSOR
EACH MISCELLANEOUS
Qty: 2 EACH | Refills: 11 | Status: SHIPPED | OUTPATIENT
Start: 2024-08-21

## 2024-08-21 RX ORDER — PIOGLITAZONEHYDROCHLORIDE 15 MG/1
15 TABLET ORAL DAILY
Qty: 90 TABLET | Refills: 3 | Status: SHIPPED | OUTPATIENT
Start: 2024-08-21 | End: 2025-08-21

## 2024-08-21 RX ORDER — FLUCONAZOLE 150 MG/1
TABLET ORAL
Qty: 2 TABLET | Refills: 0 | Status: SHIPPED | OUTPATIENT
Start: 2024-08-21

## 2024-08-21 ASSESSMENT — PAIN SCALES - GENERAL: PAINLEVEL: 0-NO PAIN

## 2024-08-21 NOTE — PROGRESS NOTES
28 F PMH: DM, goiter/thyroid nodule     DM2 vs JOHN  Low C peptide initially but on subsequent testing in 2018 then again in 2024 showing it is detectable, STARR and IA1 ab negative     Diabetes History     DM diagnosed around 2014  Complications Micro and Macro-albuminuria   A1c:   Lab Results   Component Value Date    HGBA1C 7.4 (H) 04/25/2024       IO A1c  done today 12    Currently on OCP    Regimen   Lantus 20  Prandials 6-6-6 with sliding scale 1:50 >150  Not consistently taking medication     Previously;  metformin     SMBG   Has a  shanika 3   Hypoglycemia none     Diet: non carb restricted     Comorbidities and Screening  Eye Exam: needs  Foot exam: due     Lipid  Lab Results   Component Value Date    LDLF 69 12/14/2021    TRIG 56 04/25/2024         Statin- none  Cr and albuminuria- +albuminuria   Lab Results   Component Value Date    CREATININE 0.76 04/25/2024    EGFR >90 04/25/2024    ALBUMINUR 150.8 02/13/2024      ACE/ARB- none     2)  THYROID:   Had FNA of 1.7cm right nodule- non diagnostic 8/2021  Then repeated in 10/2021 and was a benign follicular nodule     Ultrasound done in 11/2023-right nodule is stable in size       Past Medical History:   Diagnosis Date    Encounter for screening for infections with a predominantly sexual mode of transmission 01/15/2019    Routine screening for STI (sexually transmitted infection)    Encounter for surveillance of injectable contraceptive 01/14/2021    Encounter for Depo-Provera contraception    Other conditions influencing health status 01/17/2017    Uncontrolled type 2 diabetes mellitus    Other problems related to lifestyle 01/04/2016    Other problems related to lifestyle    Other specified health status 04/12/2019    No pertinent past medical history    Type 2 diabetes mellitus without complications (Multi) 10/27/2020    Diabetes type 2, controlled     Family History   Problem Relation Name Age of Onset    Diabetes type II Mother      Diabetes type II Mother's  Brother      Diabetes type II Maternal Grandmother      Other (Autoimmune disor) Other        Social History     Socioeconomic History    Marital status: Single     Spouse name: Not on file    Number of children: Not on file    Years of education: Not on file    Highest education level: Not on file   Occupational History    Not on file   Tobacco Use    Smoking status: Never     Passive exposure: Never    Smokeless tobacco: Never   Substance and Sexual Activity    Alcohol use: Never    Drug use: Never    Sexual activity: Not on file   Other Topics Concern    Not on file   Social History Narrative    Not on file     Social Determinants of Health     Financial Resource Strain: Low Risk  (4/24/2020)    Received from [facility]    Overall Financial Resource Strain (CARDIA)     Difficulty of Paying Living Expenses: Not very hard   Food Insecurity: Unknown (2/8/2023)    Received from [facility]    Hunger Vital Sign     Worried About Running Out of Food in the Last Year: Never true     Ran Out of Food in the Last Year: Not on file   Transportation Needs: No Transportation Needs (4/24/2020)    Received from [facility]    PRAPARE - Transportation     Lack of Transportation (Medical): No     Lack of Transportation (Non-Medical): No   Physical Activity: Not on File (8/19/2019)    [facility] from [facility]    Physical Activity     Physical Activity: 0   Stress: Not on File (8/19/2019)    [facility] from [facility]    Stress     Stress: 0   Social Connections: Not on File (8/19/2019)    [facility] from [facility]    Social Connections     Social Connections and Isolation: 0   Intimate Partner Violence: Not on file        ROS:  No polyuria polydipsia  Weight stable   Negative except those noted in current and interim history    Physical Exam  Constitutional:       Appearance: Normal appearance.   Cardiovascular:      Rate and Rhythm: Normal rate and regular  rhythm.   Skin:     Comments: No lipodystrophy    Neurological:      General: No focal deficit present.      Mental Status: She is alert and oriented to person, place, and time.   Psychiatric:         Mood and Affect: Mood normal.         Behavior: Behavior normal.          labs and imaging reviewed, pertinent findings listed on HPI and Impression      Problem List Items Addressed This Visit       Type 2 diabetes mellitus with hyperglycemia, with long-term current use of insulin (Multi) - Primary    Relevant Medications    insulin NPH and regular human (NovoLIN) 100 unit/mL (70-30) injection    SITagliptin phosphate (Januvia) 100 mg tablet    pioglitazone (Actos) 15 mg tablet    blood-glucose sensor (FreeStyle Marquez 3 Sensor) device    fluconazole (Diflucan) 150 mg tablet    Other Relevant Orders    Albumin-Creatinine Ratio, Urine Random    Hemoglobin A1C    Renal Function Panel    Referral to Podiatry     Other Visit Diagnoses       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

## 2024-08-21 NOTE — PATIENT INSTRUCTIONS
Switch insulin:     Novolin 15 units with breakfast, 15 units with dinner   Januvia 100mg daily   Pioglitazone 15mg daily     Labs in 3 months    Follow up next available    Graciela Cui MD  Divison of Endocrinology   [facility]   Phone: [phone]    option 4, then option 1  Fax: [phone]

## 2024-09-09 ENCOUNTER — TELEPHONE (OUTPATIENT)
Dept: OBSTETRICS AND GYNECOLOGY | Facility: CLINIC | Age: 29
End: 2024-09-09
Payer: COMMERCIAL

## 2024-09-09 NOTE — TELEPHONE ENCOUNTER
I called this patient and discussed this patient's FMLA with her. [employer] (her [employer]) will not authorize retroactive FMLA unless there is a current medical issue related to her pregnancy loss in February. I confirmed with the patient that there is not.  I informed her that we could not then authorize FMLA for her-- she expressed an understanding.

## 2024-09-17 ENCOUNTER — APPOINTMENT (OUTPATIENT)
Dept: OPHTHALMOLOGY | Facility: CLINIC | Age: 29
End: 2024-09-17
Payer: COMMERCIAL

## 2024-09-17 PROBLEM — H52.202 ASTIGMATISM OF LEFT EYE: Status: RESOLVED | Noted: 2023-10-26 | Resolved: 2024-09-17

## 2024-09-24 DIAGNOSIS — Z79.4 TYPE 2 DIABETES MELLITUS WITH HYPERGLYCEMIA, WITH LONG-TERM CURRENT USE OF INSULIN: ICD-10-CM

## 2024-09-24 DIAGNOSIS — E11.65 TYPE 2 DIABETES MELLITUS WITH HYPERGLYCEMIA, WITH LONG-TERM CURRENT USE OF INSULIN: ICD-10-CM

## 2024-09-24 DIAGNOSIS — B37.9 CANDIDIASIS: Primary | ICD-10-CM

## 2024-09-24 RX ORDER — FLUCONAZOLE 150 MG/1
TABLET ORAL
Qty: 2 TABLET | Refills: 0 | Status: SHIPPED | OUTPATIENT
Start: 2024-09-24

## 2024-10-03 DIAGNOSIS — Z79.4 TYPE 2 DIABETES MELLITUS WITH HYPERGLYCEMIA, WITH LONG-TERM CURRENT USE OF INSULIN: ICD-10-CM

## 2024-10-03 DIAGNOSIS — E11.65 TYPE 2 DIABETES MELLITUS WITH HYPERGLYCEMIA, WITH LONG-TERM CURRENT USE OF INSULIN: ICD-10-CM

## 2024-10-03 DIAGNOSIS — E13.9: ICD-10-CM

## 2024-10-04 DIAGNOSIS — Z79.4 TYPE 2 DIABETES MELLITUS WITH HYPERGLYCEMIA, WITH LONG-TERM CURRENT USE OF INSULIN: ICD-10-CM

## 2024-10-04 DIAGNOSIS — E11.65 TYPE 2 DIABETES MELLITUS WITH HYPERGLYCEMIA, WITH LONG-TERM CURRENT USE OF INSULIN: ICD-10-CM

## 2024-10-07 RX ORDER — BLOOD-GLUCOSE SENSOR
EACH MISCELLANEOUS
Qty: 2 EACH | Refills: 11 | Status: SHIPPED | OUTPATIENT
Start: 2024-10-07

## 2024-10-17 DIAGNOSIS — B37.9 CANDIDIASIS: ICD-10-CM

## 2024-10-17 RX ORDER — FLUCONAZOLE 150 MG/1
TABLET ORAL
Qty: 2 TABLET | Refills: 0 | Status: SHIPPED | OUTPATIENT
Start: 2024-10-17

## 2025-01-10 ENCOUNTER — APPOINTMENT (OUTPATIENT)
Dept: ENDOCRINOLOGY | Facility: CLINIC | Age: 30
End: 2025-01-10
Payer: COMMERCIAL

## 2025-01-10 VITALS
HEIGHT: 64 IN | WEIGHT: 130 LBS | DIASTOLIC BLOOD PRESSURE: 69 MMHG | SYSTOLIC BLOOD PRESSURE: 101 MMHG | BODY MASS INDEX: 22.2 KG/M2 | HEART RATE: 74 BPM

## 2025-01-10 DIAGNOSIS — E13.9: ICD-10-CM

## 2025-01-10 DIAGNOSIS — E11.65 TYPE 2 DIABETES MELLITUS WITH HYPERGLYCEMIA, WITH LONG-TERM CURRENT USE OF INSULIN: ICD-10-CM

## 2025-01-10 DIAGNOSIS — Z79.4 TYPE 2 DIABETES MELLITUS WITH HYPERGLYCEMIA, WITH LONG-TERM CURRENT USE OF INSULIN: ICD-10-CM

## 2025-01-10 LAB — POC HEMOGLOBIN A1C: 13.8 % (ref 4.2–6.5)

## 2025-01-10 PROCEDURE — 1036F TOBACCO NON-USER: CPT | Performed by: STUDENT IN AN ORGANIZED HEALTH CARE EDUCATION/TRAINING PROGRAM

## 2025-01-10 PROCEDURE — 83036 HEMOGLOBIN GLYCOSYLATED A1C: CPT | Performed by: STUDENT IN AN ORGANIZED HEALTH CARE EDUCATION/TRAINING PROGRAM

## 2025-01-10 PROCEDURE — 3074F SYST BP LT 130 MM HG: CPT | Performed by: STUDENT IN AN ORGANIZED HEALTH CARE EDUCATION/TRAINING PROGRAM

## 2025-01-10 PROCEDURE — 99214 OFFICE O/P EST MOD 30 MIN: CPT | Performed by: STUDENT IN AN ORGANIZED HEALTH CARE EDUCATION/TRAINING PROGRAM

## 2025-01-10 PROCEDURE — 3008F BODY MASS INDEX DOCD: CPT | Performed by: STUDENT IN AN ORGANIZED HEALTH CARE EDUCATION/TRAINING PROGRAM

## 2025-01-10 PROCEDURE — 3078F DIAST BP <80 MM HG: CPT | Performed by: STUDENT IN AN ORGANIZED HEALTH CARE EDUCATION/TRAINING PROGRAM

## 2025-01-10 RX ORDER — BLOOD SUGAR DIAGNOSTIC
100 STRIP MISCELLANEOUS DAILY
Qty: 100 EACH | Refills: 2 | Status: SHIPPED | OUTPATIENT
Start: 2025-01-10

## 2025-01-10 RX ORDER — PIOGLITAZONEHYDROCHLORIDE 15 MG/1
15 TABLET ORAL DAILY
Qty: 90 TABLET | Refills: 3 | Status: SHIPPED | OUTPATIENT
Start: 2025-01-10 | End: 2026-01-10

## 2025-01-10 RX ORDER — BLOOD-GLUCOSE SENSOR
EACH MISCELLANEOUS
Qty: 2 EACH | Refills: 11 | Status: SHIPPED | OUTPATIENT
Start: 2025-01-10

## 2025-01-10 RX ORDER — LANCETS
100 EACH MISCELLANEOUS DAILY
Qty: 100 EACH | Refills: 2 | Status: SHIPPED | OUTPATIENT
Start: 2025-01-10

## 2025-01-10 ASSESSMENT — PAIN SCALES - GENERAL: PAINLEVEL_OUTOF10: 0-NO PAIN

## 2025-01-10 NOTE — PATIENT INSTRUCTIONS
Januvia 100mg daily   Pioglitazone   70/30 15 units with breakfast and 15 units with dinner     Nutrition consult    Follow up in about 4 months    Graciela Cui MD  Divison of Endocrinology   [facility]   Phone: [phone]    option 4, then option 1  Fax: [phone]

## 2025-01-10 NOTE — PROGRESS NOTES
28 F PMH: DM, goiter/thyroid nodule     DM2 vs JOHN  Low C peptide initially but on subsequent testing in 2018 then again in 2024 showing it is detectable, STARR and IA1 ab negative     Diabetes History     DM diagnosed around 2014  Complications Micro and Macro-albuminuria   A1c:   Lab Results   Component Value Date    HGBA1C 12.7 (A) 08/21/2024       IO A1c  done today 13.8  Previous visit we switched to mixed insulin to improved compliance.  She is still currently not taking her meds    Prescribed regimen:   Januvia  Pioglitazone 15mg daily  70/30 15 units BID     Previously;  metformin     SMBG   Has a  shanika 3   Hypoglycemia none     Diet: non carb restricted   Drinks soda     Comorbidities and Screening  Eye Exam: needs  Foot exam: 1/2025    Lipid  Lab Results   Component Value Date    LDLF 69 12/14/2021    TRIG 56 04/25/2024         Statin- none  Cr and albuminuria- +albuminuria   Lab Results   Component Value Date    CREATININE 0.76 04/25/2024    EGFR >90 04/25/2024    ALBUMINUR 150.8 02/13/2024      ACE/ARB- none     2)  THYROID:   Had FNA of 1.7cm right nodule- non diagnostic 8/2021  Then repeated in 10/2021 and was a benign follicular nodule     Ultrasound done in 1/2024 showing stable in size of right sided     Past Medical History:   Diagnosis Date    Encounter for screening for infections with a predominantly sexual mode of transmission 01/15/2019    Routine screening for STI (sexually transmitted infection)    Encounter for surveillance of injectable contraceptive 01/14/2021    Encounter for Depo-Provera contraception    Other conditions influencing health status 01/17/2017    Uncontrolled type 2 diabetes mellitus    Other problems related to lifestyle 01/04/2016    Other problems related to lifestyle    Other specified health status 04/12/2019    No pertinent past medical history    Type 2 diabetes mellitus without complications (Multi) 10/27/2020    Diabetes type 2, controlled     Family History    Problem Relation Name Age of Onset    Diabetes type II Mother      Diabetes type II Mother's Brother      Diabetes type II Maternal Grandmother      Other (Autoimmune disor) Other        Social History     Socioeconomic History    Marital status: Single     Spouse name: Not on file    Number of children: Not on file    Years of education: Not on file    Highest education level: Not on file   Occupational History    Not on file   Tobacco Use    Smoking status: Never     Passive exposure: Never    Smokeless tobacco: Never   Substance and Sexual Activity    Alcohol use: Never    Drug use: Never    Sexual activity: Not on file   Other Topics Concern    Not on file   Social History Narrative    Not on file     Social Drivers of Health     Financial Resource Strain: Low Risk  (4/24/2020)    Received from [facility]    Overall Financial Resource Strain (CARDIA)     Difficulty of Paying Living Expenses: Not very hard   Food Insecurity: Unknown (11/27/2024)    Received from [facility]    Hunger Vital Sign     Worried About Running Out of Food in the Last Year: Never true     Ran Out of Food in the Last Year: Not on file   Transportation Needs: No Transportation Needs (4/24/2020)    Received from [facility]    PRAPARE - Transportation     Lack of Transportation (Medical): No     Lack of Transportation (Non-Medical): No   Physical Activity: Not on File (8/19/2019)    [facility] from [facility]    Physical Activity     Physical Activity: 0   Stress: Not on File (8/19/2019)    [facility] from [facility]    Stress     Stress: 0   Social Connections: Not on File (9/19/2024)    [facility] from [facility]    Social Connections     Connectedness: 0   Intimate Partner Violence: Not on file        ROS:  No polyuria polydipsia  Weight stable   Negative except those noted in current and interim history    Physical Exam  Constitutional:       Appearance: Normal appearance.   HENT:       Head: Normocephalic.   Cardiovascular:      Rate and Rhythm: Normal rate and regular rhythm.      Pulses:           Dorsalis pedis pulses are 2+ on the right side and 2+ on the left side.        Posterior tibial pulses are 2+ on the right side and 2+ on the left side.   Musculoskeletal:         General: No swelling.   Feet:      Right foot:      Protective Sensation: 6 sites tested.  6 sites sensed.      Left foot:      Protective Sensation: 6 sites tested.  6 sites sensed.      Comments: Dry skin  Skin:     General: Skin is warm.   Neurological:      General: No focal deficit present.      Mental Status: She is alert and oriented to person, place, and time.   Psychiatric:         Mood and Affect: Mood normal.         Behavior: Behavior normal.          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), managed as type 2 (Multi)        Relevant Medications    OneTouch Ultra Test strip    lancets (OneTouch UltraSoft Lancets) misc              DM2 with hyperglycemia  Uncontrolled with non compliance with diabetes regimen, currently not taking her regimen    Switch to mixed insulin 70/30 15 units BID  Pioglitazone 15mg daily  Januvia 100mg daily     Foot exam done today  Nutrition consult for carb controlled diet         Thyroid nodule:  Ultrasound done in 1/2024 showing stable in size of right sided, with previous negative biopsy   Can extended repeat imaging to late this year or next  year    Follow up in 4 months

## 2025-01-19 ENCOUNTER — HOSPITAL ENCOUNTER (EMERGENCY)
Facility: HOSPITAL | Age: 30
Discharge: HOME | End: 2025-01-20
Payer: COMMERCIAL

## 2025-01-19 ENCOUNTER — APPOINTMENT (OUTPATIENT)
Dept: RADIOLOGY | Facility: HOSPITAL | Age: 30
End: 2025-01-19
Payer: COMMERCIAL

## 2025-01-19 DIAGNOSIS — J10.1 INFLUENZA A: Primary | ICD-10-CM

## 2025-01-19 LAB
FLUAV RNA RESP QL NAA+PROBE: DETECTED
FLUBV RNA RESP QL NAA+PROBE: NOT DETECTED
RSV RNA RESP QL NAA+PROBE: NOT DETECTED
S PYO DNA THROAT QL NAA+PROBE: NOT DETECTED
SARS-COV-2 RNA RESP QL NAA+PROBE: NOT DETECTED

## 2025-01-19 PROCEDURE — 87637 SARSCOV2&INF A&B&RSV AMP PRB: CPT | Performed by: EMERGENCY MEDICINE

## 2025-01-19 PROCEDURE — 81025 URINE PREGNANCY TEST: CPT

## 2025-01-19 PROCEDURE — 71046 X-RAY EXAM CHEST 2 VIEWS: CPT

## 2025-01-19 PROCEDURE — 2500000004 HC RX 250 GENERAL PHARMACY W/ HCPCS (ALT 636 FOR OP/ED): Mod: SE

## 2025-01-19 PROCEDURE — 87651 STREP A DNA AMP PROBE: CPT

## 2025-01-19 PROCEDURE — 99284 EMERGENCY DEPT VISIT MOD MDM: CPT | Mod: 25

## 2025-01-19 PROCEDURE — 71046 X-RAY EXAM CHEST 2 VIEWS: CPT | Mod: FOREIGN READ | Performed by: RADIOLOGY

## 2025-01-19 PROCEDURE — 2500000001 HC RX 250 WO HCPCS SELF ADMINISTERED DRUGS (ALT 637 FOR MEDICARE OP): Mod: SE

## 2025-01-19 RX ORDER — DEXAMETHASONE 6 MG/1
6 TABLET ORAL ONCE
Status: COMPLETED | OUTPATIENT
Start: 2025-01-19 | End: 2025-01-19

## 2025-01-19 RX ORDER — ACETAMINOPHEN 325 MG/1
650 TABLET ORAL ONCE
Status: COMPLETED | OUTPATIENT
Start: 2025-01-19 | End: 2025-01-19

## 2025-01-19 RX ADMIN — DEXAMETHASONE 6 MG: 6 TABLET ORAL at 22:31

## 2025-01-19 RX ADMIN — ACETAMINOPHEN 650 MG: 325 TABLET, FILM COATED ORAL at 22:32

## 2025-01-19 ASSESSMENT — COLUMBIA-SUICIDE SEVERITY RATING SCALE - C-SSRS
2. HAVE YOU ACTUALLY HAD ANY THOUGHTS OF KILLING YOURSELF?: NO
6. HAVE YOU EVER DONE ANYTHING, STARTED TO DO ANYTHING, OR PREPARED TO DO ANYTHING TO END YOUR LIFE?: NO
1. IN THE PAST MONTH, HAVE YOU WISHED YOU WERE DEAD OR WISHED YOU COULD GO TO SLEEP AND NOT WAKE UP?: NO

## 2025-01-19 ASSESSMENT — PAIN - FUNCTIONAL ASSESSMENT: PAIN_FUNCTIONAL_ASSESSMENT: 0-10

## 2025-01-19 ASSESSMENT — PAIN SCALES - GENERAL: PAINLEVEL_OUTOF10: 10 - WORST POSSIBLE PAIN

## 2025-01-19 NOTE — Clinical Note
Sindy Rascon was seen and treated in our emergency department on 1/19/2025.  She may return to work on 01/23/2025.       If you have any questions or concerns, please don't hesitate to call.      Jonathan Lewis PA-C

## 2025-01-20 VITALS
WEIGHT: 130 LBS | SYSTOLIC BLOOD PRESSURE: 118 MMHG | DIASTOLIC BLOOD PRESSURE: 76 MMHG | TEMPERATURE: 99.1 F | OXYGEN SATURATION: 99 % | HEART RATE: 67 BPM | BODY MASS INDEX: 22.2 KG/M2 | RESPIRATION RATE: 16 BRPM | HEIGHT: 64 IN

## 2025-01-20 LAB
ALBUMIN SERPL BCP-MCNC: 4.3 G/DL (ref 3.4–5)
ALP SERPL-CCNC: 50 U/L (ref 33–110)
ALT SERPL W P-5'-P-CCNC: 20 U/L (ref 7–45)
ANION GAP SERPL CALC-SCNC: 13 MMOL/L (ref 10–20)
APPEARANCE UR: CLEAR
AST SERPL W P-5'-P-CCNC: 15 U/L (ref 9–39)
BASOPHILS # BLD AUTO: 0.01 X10*3/UL (ref 0–0.1)
BASOPHILS NFR BLD AUTO: 0.1 %
BILIRUB SERPL-MCNC: 1 MG/DL (ref 0–1.2)
BILIRUB UR STRIP.AUTO-MCNC: NEGATIVE MG/DL
BUN SERPL-MCNC: 9 MG/DL (ref 6–23)
CALCIUM SERPL-MCNC: 9.3 MG/DL (ref 8.6–10.6)
CHLORIDE SERPL-SCNC: 99 MMOL/L (ref 98–107)
CO2 SERPL-SCNC: 25 MMOL/L (ref 21–32)
COLOR UR: ABNORMAL
CREAT SERPL-MCNC: 0.61 MG/DL (ref 0.5–1.05)
EGFRCR SERPLBLD CKD-EPI 2021: >90 ML/MIN/1.73M*2
EOSINOPHIL # BLD AUTO: 0.03 X10*3/UL (ref 0–0.7)
EOSINOPHIL NFR BLD AUTO: 0.4 %
ERYTHROCYTE [DISTWIDTH] IN BLOOD BY AUTOMATED COUNT: 10.7 % (ref 11.5–14.5)
GLUCOSE BLD MANUAL STRIP-MCNC: 399 MG/DL (ref 74–99)
GLUCOSE BLD MANUAL STRIP-MCNC: 446 MG/DL (ref 74–99)
GLUCOSE BLD MANUAL STRIP-MCNC: 447 MG/DL (ref 74–99)
GLUCOSE SERPL-MCNC: 427 MG/DL (ref 74–99)
GLUCOSE UR STRIP.AUTO-MCNC: ABNORMAL MG/DL
HCT VFR BLD AUTO: 34.5 % (ref 36–46)
HGB BLD-MCNC: 12.1 G/DL (ref 12–16)
HOLD SPECIMEN: NORMAL
IMM GRANULOCYTES # BLD AUTO: 0.02 X10*3/UL (ref 0–0.7)
IMM GRANULOCYTES NFR BLD AUTO: 0.3 % (ref 0–0.9)
KETONES UR STRIP.AUTO-MCNC: ABNORMAL MG/DL
LEUKOCYTE ESTERASE UR QL STRIP.AUTO: NEGATIVE
LYMPHOCYTES # BLD AUTO: 0.65 X10*3/UL (ref 1.2–4.8)
LYMPHOCYTES NFR BLD AUTO: 9.1 %
MCH RBC QN AUTO: 30.8 PG (ref 26–34)
MCHC RBC AUTO-ENTMCNC: 35.1 G/DL (ref 32–36)
MCV RBC AUTO: 88 FL (ref 80–100)
MONOCYTES # BLD AUTO: 0.38 X10*3/UL (ref 0.1–1)
MONOCYTES NFR BLD AUTO: 5.3 %
NEUTROPHILS # BLD AUTO: 6.02 X10*3/UL (ref 1.2–7.7)
NEUTROPHILS NFR BLD AUTO: 84.8 %
NITRITE UR QL STRIP.AUTO: NEGATIVE
NRBC BLD-RTO: 0 /100 WBCS (ref 0–0)
PH UR STRIP.AUTO: 5.5 [PH]
PLATELET # BLD AUTO: 197 X10*3/UL (ref 150–450)
POTASSIUM SERPL-SCNC: 4 MMOL/L (ref 3.5–5.3)
PREGNANCY TEST URINE, POC: NEGATIVE
PROT SERPL-MCNC: 7 G/DL (ref 6.4–8.2)
PROT UR STRIP.AUTO-MCNC: NEGATIVE MG/DL
RBC # BLD AUTO: 3.93 X10*6/UL (ref 4–5.2)
RBC # UR STRIP.AUTO: NEGATIVE /UL
SODIUM SERPL-SCNC: 133 MMOL/L (ref 136–145)
SP GR UR STRIP.AUTO: 1.04
UROBILINOGEN UR STRIP.AUTO-MCNC: NORMAL MG/DL
WBC # BLD AUTO: 7.1 X10*3/UL (ref 4.4–11.3)

## 2025-01-20 PROCEDURE — 85025 COMPLETE CBC W/AUTO DIFF WBC: CPT

## 2025-01-20 PROCEDURE — 36415 COLL VENOUS BLD VENIPUNCTURE: CPT

## 2025-01-20 PROCEDURE — 2500000002 HC RX 250 W HCPCS SELF ADMINISTERED DRUGS (ALT 637 FOR MEDICARE OP, ALT 636 FOR OP/ED): Mod: SE

## 2025-01-20 PROCEDURE — 2500000004 HC RX 250 GENERAL PHARMACY W/ HCPCS (ALT 636 FOR OP/ED): Mod: SE

## 2025-01-20 PROCEDURE — 80053 COMPREHEN METABOLIC PANEL: CPT

## 2025-01-20 PROCEDURE — 96360 HYDRATION IV INFUSION INIT: CPT

## 2025-01-20 PROCEDURE — 82947 ASSAY GLUCOSE BLOOD QUANT: CPT | Mod: 59

## 2025-01-20 PROCEDURE — 81003 URINALYSIS AUTO W/O SCOPE: CPT

## 2025-01-20 RX ORDER — DEXTROSE 50 % IN WATER (D50W) INTRAVENOUS SYRINGE
25
Status: DISCONTINUED | OUTPATIENT
Start: 2025-01-20 | End: 2025-01-20 | Stop reason: HOSPADM

## 2025-01-20 RX ORDER — DEXTROSE 50 % IN WATER (D50W) INTRAVENOUS SYRINGE
12.5
Status: DISCONTINUED | OUTPATIENT
Start: 2025-01-20 | End: 2025-01-20 | Stop reason: HOSPADM

## 2025-01-20 RX ADMIN — SODIUM CHLORIDE, POTASSIUM CHLORIDE, SODIUM LACTATE AND CALCIUM CHLORIDE 1000 ML: 600; 310; 30; 20 INJECTION, SOLUTION INTRAVENOUS at 00:47

## 2025-01-20 RX ADMIN — INSULIN HUMAN 15 UNITS: 100 INJECTION, SUSPENSION SUBCUTANEOUS at 02:43

## 2025-01-20 ASSESSMENT — PAIN - FUNCTIONAL ASSESSMENT: PAIN_FUNCTIONAL_ASSESSMENT: 0-10

## 2025-01-20 ASSESSMENT — PAIN SCALES - GENERAL: PAINLEVEL_OUTOF10: 0 - NO PAIN

## 2025-01-20 NOTE — ED PROVIDER NOTES
HPI   Chief Complaint   Patient presents with    Flu Symptoms       HPI    Sindy Rascon is a 29-year-old female with history of type 2 diabetes presenting to the ED with flulike symptoms for 1 week.  Patient states she has been having a runny nose, congestion, body aches, and sore throat.  Patient states she still has been able to swallow and denies drooling.  Patient states she also began having a productive cough yesterday.  Patient states her child is currently sick but does not know what they are sick with.  Patient states she has been short of breath while laying down.  Patient denies chest pain, abdominal pain, nausea, vomiting, fevers, chills.    Patient History   Past Medical History:   Diagnosis Date    Encounter for screening for infections with a predominantly sexual mode of transmission 01/15/2019    Routine screening for STI (sexually transmitted infection)    Encounter for surveillance of injectable contraceptive 2021    Encounter for Depo-Provera contraception    Other conditions influencing health status 2017    Uncontrolled type 2 diabetes mellitus    Other problems related to lifestyle 2016    Other problems related to lifestyle    Other specified health status 2019    No pertinent past medical history    Type 2 diabetes mellitus without complications (Multi) 10/27/2020    Diabetes type 2, controlled     Past Surgical History:   Procedure Laterality Date    OTHER SURGICAL HISTORY  2016    Dental Surgery    OTHER SURGICAL HISTORY  10/27/2020     section    US GUIDED THYROID BIOPSY  10/4/2021    US GUIDED THYROID BIOPSY 10/4/2021 [facility]     Family History   Problem Relation Name Age of Onset    Diabetes type II Mother      Diabetes type II Mother's Brother      Diabetes type II Maternal Grandmother      Other (Autoimmune disor) Other       Social History     Tobacco Use    Smoking status: Never     Passive exposure: Never    Smokeless tobacco: Never    Substance Use Topics    Alcohol use: Never    Drug use: Never       Physical Exam   ED Triage Vitals [01/19/25 2038]   Temperature Heart Rate Respirations BP   37.3 °C (99.1 °F) 94 18 116/74      Pulse Ox Temp Source Heart Rate Source Patient Position   98 % Temporal Monitor --      BP Location FiO2 (%)     -- --       Physical Exam  Vitals and nursing note reviewed.   Constitutional:       Appearance: Normal appearance.   HENT:      Mouth/Throat:      Pharynx: Uvula midline. Posterior oropharyngeal erythema present. No pharyngeal swelling or oropharyngeal exudate.      Tonsils: No tonsillar exudate or tonsillar abscesses.   Cardiovascular:      Rate and Rhythm: Normal rate.      Heart sounds: Normal heart sounds. No murmur heard.     No gallop.   Pulmonary:      Effort: Pulmonary effort is normal. No respiratory distress.      Breath sounds: Normal breath sounds. No stridor. No wheezing, rhonchi or rales.   Abdominal:      General: Abdomen is flat. Bowel sounds are normal. There is no distension.      Palpations: Abdomen is soft. There is no mass.      Tenderness: There is no abdominal tenderness. There is no guarding or rebound.      Hernia: No hernia is present.   Musculoskeletal:      Right lower leg: No edema.      Left lower leg: No edema.   Skin:     General: Skin is warm and dry.   Neurological:      General: No focal deficit present.      Mental Status: She is alert and oriented to person, place, and time.   Psychiatric:         Mood and Affect: Mood normal.         Behavior: Behavior normal.           ED Course & MDM   ED Course as of 01/20/25 1359   Mon Jan 20, 2025   0452 29-year-old female with type 2 diabetes on insulin presents with bodyaches and hyperglycemia.  Patient has had bodyaches and productive cough several days, her daughter has had similar.  Patient denies any fever.  Patient has not taken her insulin today but recently saw her endocrinologist on the 10th of this month with a hemoglobin  A1c of 13.  Patient has her diabetes medication and supplies at home.  Patient denies any abdominal pain flank pain dysuria polyuria polydipsia  Physical exam patient looks tired but otherwise nontoxic.  She is hemodynamically stable afebrile normal cardiac.  She has clear lungs bilaterally normal heart sounds and a benign abdomen.  She has no lower extremity edema or JVD.  Venous blood gas shows a blood glucose in the 300s but no evidence of diabetic ketoacidosis  29-year-old female presents with bodyaches and cough as well as hyperglycemia.  The body aches and cough favor a viral illness versus bacterial pneumonia.  Will send swabs get a chest x-ray.  Patient hyperglycemia is likely in the context of her medication noncompliance as her hemoglobin A1c states that her normal blood glucose is in the 300s.  This could also be reactive due to an underlying viral illness.  We will give her her home medications for diabetes as we do the rest of her workup.    Update: Patient found to be flu a positive.  Patient glucoses in the 300s.  Patient did receive her insulin and fluids.  Patient to be discharged home to continue her outpatient management of her diabetes.  Patient has outpatient endocrinology follow-up and all of her diabetes medications and supplies.  Patient to return if her blood glucoses are high insofar as unreadable on her glucose monitor or she develops fever polyuria polydipsia. [CM]      ED Course User Index  [CM] Rohith Strauss MD         Diagnoses as of 01/20/25 1359   Influenza A                 No data recorded     Roderick Coma Scale Score: 15 (01/19/25 2039 : Margarita Pascual RN)                           Medical Decision Making  This is a 29-year-old female presenting the ED with flulike symptoms for 1 week.  Patient states she has been having a runny nose, congestion, body aches, and sore throat.  On exam patient has erythema to the posterior oropharynx.  No peritonsillar abscess noted  bilaterally.  Strep swab was obtained to rule out strep pharyngitis.  COVID, influenza, RSV swabs are also obtained to determine etiology of patient's symptoms.  Patient states she also began having a productive cough yesterday.  Chest x-ray was obtained for further evaluation of productive cough with concern for pneumonia.  Chest x-ray shows no acute cardiopulmonary pathology.  RSV, strep, COVID swabs are negative.  Influenza swab is positive for influenza A.  Point-of-care glucose was obtained given patient's history of type 2 diabetes.  Point-of-care glucose was elevated at 399.  VBG was obtained to rule out DKA.  EKG shows the patient not in DKA with pH of 7.32, CO2 41, bicarb of 23.7, and anion gap of 13.  Blood glucose on VBG was 522.  Patient was given 1 L LR bolus for hyperglycemia as well as home insulin 70/30 NPH 15 units. Repeat glucose after fluids was 447.  Repeat glucose after home insulin was 446.  Per chart review the patient had an endocrinology appointment on January 10 which stated the patient is noncompliance on her medications.  Most recent A1c is 13.8.  Patient's glucose today correlates with A1c.  Patient does have her diabetes prescriptions at home as well as supplies.  When patient initially arrived to the ED her temperature was elevated at 99.1 °F.  Patient was given Tylenol for elevated temperature.  Patient also given Decadron for sore throat.  Patient has been hemodynamically stable in the emergency department today.    Disposition: Discharge home  Patient was advised to follow-up with her primary care provider for further evaluation of her symptoms as well as endocrinology provider for further evaluation of hyperglycemia.  Strict return precautions were given.  Plan was discussed with the patient patient understands and agrees.  Patient was discharged stable condition.    Patient was discussed and staffed with Dr. Strauss    Procedure  Procedures     Jonathan Lewis PA-C  01/20/25  [id number]

## 2025-01-20 NOTE — ED TRIAGE NOTES
Patient presents to the ED for runny nose, sore throat, congestion, and a cough since Monday. Denies chest pain or shortness of breath.

## 2025-01-20 NOTE — DISCHARGE INSTRUCTIONS
Please follow-up with your primary care provider.  Return to the emergency department if your symptoms persist or worsen or any new symptoms began.

## 2025-02-04 LAB
ANION GAP BLDV CALCULATED.4IONS-SCNC: 13 MMOL/L (ref 10–25)
BASE EXCESS BLDV CALC-SCNC: -1.5 MMOL/L (ref -2–3)
BODY TEMPERATURE: 37 DEGREES CELSIUS
CA-I BLDV-SCNC: 1.21 MMOL/L (ref 1.1–1.33)
CHLORIDE BLDV-SCNC: 97 MMOL/L (ref 98–107)
GLUCOSE BLDV-MCNC: 522 MG/DL (ref 74–99)
HCO3 BLDV-SCNC: 23.7 MMOL/L (ref 22–26)
HCT VFR BLD EST: 38 % (ref 36–46)
HGB BLDV-MCNC: 12.8 G/DL (ref 12–16)
LACTATE BLDV-SCNC: 1 MMOL/L (ref 0.4–2)
OXYHGB MFR BLDV: 70.1 % (ref 45–75)
PCO2 BLDV: 41 MM HG (ref 41–51)
PH BLDV: 7.37 PH (ref 7.33–7.43)
PO2 BLDV: 46 MM HG (ref 35–45)
POTASSIUM BLDV-SCNC: 3.9 MMOL/L (ref 3.5–5.3)
SAO2 % BLDV: 72 % (ref 45–75)
SODIUM BLDV-SCNC: 130 MMOL/L (ref 136–145)

## 2025-04-14 DIAGNOSIS — B37.9 CANDIDIASIS: ICD-10-CM

## 2025-04-14 RX ORDER — FLUCONAZOLE 150 MG/1
TABLET ORAL
Qty: 2 TABLET | Refills: 1 | Status: SHIPPED | OUTPATIENT
Start: 2025-04-14

## 2025-07-29 ENCOUNTER — PATIENT MESSAGE (OUTPATIENT)
Dept: MATERNAL FETAL MEDICINE | Facility: CLINIC | Age: 30
End: 2025-07-29
Payer: COMMERCIAL

## 2025-07-29 DIAGNOSIS — E11.65 TYPE 2 DIABETES MELLITUS WITH HYPERGLYCEMIA, WITH LONG-TERM CURRENT USE OF INSULIN: ICD-10-CM

## 2025-07-29 DIAGNOSIS — Z79.4 TYPE 2 DIABETES MELLITUS WITH HYPERGLYCEMIA, WITH LONG-TERM CURRENT USE OF INSULIN: ICD-10-CM

## 2025-07-30 RX ORDER — ISOPROPYL ALCOHOL 70 ML/100ML
SWAB TOPICAL
Qty: 200 EACH | Refills: 3 | Status: SHIPPED | OUTPATIENT
Start: 2025-07-30

## 2025-07-30 RX ORDER — PEN NEEDLE, DIABETIC 30 GX3/16"
NEEDLE, DISPOSABLE MISCELLANEOUS
Qty: 200 EACH | Refills: 3 | Status: SHIPPED | OUTPATIENT
Start: 2025-07-30

## 2025-07-30 RX ORDER — INSULIN LISPRO 100 [IU]/ML
INJECTION, SOLUTION INTRAVENOUS; SUBCUTANEOUS
Qty: 30 ML | Refills: 3 | Status: SHIPPED | OUTPATIENT
Start: 2025-07-30

## 2025-07-30 RX ORDER — HUMAN INSULIN 100 [IU]/ML
INJECTION, SUSPENSION SUBCUTANEOUS
Qty: 30 ML | Refills: 3 | Status: SHIPPED | OUTPATIENT
Start: 2025-07-30

## 2025-07-30 NOTE — TELEPHONE ENCOUNTER
Pt returned call.  Stopped taking Jardiance and Januvia last week. Taking insulin 70/30 10 units BID inconsistently. Will stop 70/30 insulin today.    Agreeable to starting on NPH/Lispro regimen, but very nervous about going low. Will start with small doses for both. Provided education that as her sugars start to come down she is likely going to have symptoms of low blood sugar, even with sugars that are well above goal.    Will begin:  NPH 10/10 am/pm  Lispro 6/6/6 with meals

## 2025-08-13 ENCOUNTER — PATIENT MESSAGE (OUTPATIENT)
Dept: MATERNAL FETAL MEDICINE | Facility: CLINIC | Age: 30
End: 2025-08-13

## 2025-08-13 ENCOUNTER — APPOINTMENT (OUTPATIENT)
Facility: CLINIC | Age: 30
End: 2025-08-13
Payer: COMMERCIAL

## 2025-08-13 ENCOUNTER — APPOINTMENT (OUTPATIENT)
Dept: LAB | Facility: HOSPITAL | Age: 30
End: 2025-08-13
Payer: COMMERCIAL

## 2025-08-13 VITALS — WEIGHT: 137.6 LBS | BODY MASS INDEX: 23.62 KG/M2 | DIASTOLIC BLOOD PRESSURE: 80 MMHG | SYSTOLIC BLOOD PRESSURE: 113 MMHG

## 2025-08-13 DIAGNOSIS — O24.019 PRE-EXISTING TYPE 1 DIABETES MELLITUS DURING PREGNANCY, ANTEPARTUM (HHS-HCC): ICD-10-CM

## 2025-08-13 DIAGNOSIS — Z3A.01 6 WEEKS GESTATION OF PREGNANCY (HHS-HCC): Primary | ICD-10-CM

## 2025-08-13 DIAGNOSIS — Z13.31 DEPRESSION SCREENING: ICD-10-CM

## 2025-08-13 LAB
ERYTHROCYTE [DISTWIDTH] IN BLOOD BY AUTOMATED COUNT: 11.9 % (ref 11.5–14.5)
HCT VFR BLD AUTO: 39.6 % (ref 36–46)
HGB BLD-MCNC: 12.7 G/DL (ref 12–16)
MCH RBC QN AUTO: 30.8 PG (ref 26–34)
MCHC RBC AUTO-ENTMCNC: 32.1 G/DL (ref 32–36)
MCV RBC AUTO: 96 FL (ref 80–100)
NRBC BLD-RTO: 0 /100 WBCS (ref 0–0)
PLATELET # BLD AUTO: 244 X10*3/UL (ref 150–450)
RBC # BLD AUTO: 4.13 X10*6/UL (ref 4–5.2)
REFLEX ADDED, ANEMIA PANEL: NORMAL
WBC # BLD AUTO: 6.7 X10*3/UL (ref 4.4–11.3)

## 2025-08-13 PROCEDURE — 99215 OFFICE O/P EST HI 40 MIN: CPT | Performed by: OBSTETRICS & GYNECOLOGY

## 2025-08-13 PROCEDURE — 85027 COMPLETE CBC AUTOMATED: CPT

## 2025-08-13 SDOH — ECONOMIC STABILITY: FOOD INSECURITY: WITHIN THE PAST 12 MONTHS, THE FOOD YOU BOUGHT JUST DIDN'T LAST AND YOU DIDN'T HAVE MONEY TO GET MORE.: NEVER TRUE

## 2025-08-13 SDOH — ECONOMIC STABILITY: FOOD INSECURITY: WITHIN THE PAST 12 MONTHS, YOU WORRIED THAT YOUR FOOD WOULD RUN OUT BEFORE YOU GOT MONEY TO BUY MORE.: NEVER TRUE

## 2025-08-13 SDOH — ECONOMIC STABILITY: TRANSPORTATION INSECURITY
IN THE PAST 12 MONTHS, HAS LACK OF TRANSPORTATION KEPT YOU FROM MEETINGS, WORK, OR FROM GETTING THINGS NEEDED FOR DAILY LIVING?: NO

## 2025-08-13 SDOH — ECONOMIC STABILITY: TRANSPORTATION INSECURITY
IN THE PAST 12 MONTHS, HAS THE LACK OF TRANSPORTATION KEPT YOU FROM MEDICAL APPOINTMENTS OR FROM GETTING MEDICATIONS?: NO

## 2025-08-13 SDOH — HEALTH STABILITY: PHYSICAL HEALTH: ON AVERAGE, HOW MANY DAYS PER WEEK DO YOU ENGAGE IN MODERATE TO STRENUOUS EXERCISE (LIKE A BRISK WALK)?: 7 DAYS

## 2025-08-13 SDOH — HEALTH STABILITY: PHYSICAL HEALTH: ON AVERAGE, HOW MANY MINUTES DO YOU ENGAGE IN EXERCISE AT THIS LEVEL?: 20 MIN

## 2025-08-13 ASSESSMENT — SOCIAL DETERMINANTS OF HEALTH (SDOH)
WITHIN THE LAST YEAR, HAVE YOU BEEN HUMILIATED OR EMOTIONALLY ABUSED IN OTHER WAYS BY YOUR PARTNER OR EX-PARTNER?: NO
IN A TYPICAL WEEK, HOW MANY TIMES DO YOU TALK ON THE PHONE WITH FAMILY, FRIENDS, OR NEIGHBORS?: MORE THAN THREE TIMES A WEEK
HOW OFTEN DO YOU ATTEND CHURCH OR RELIGIOUS SERVICES?: NEVER
WITHIN THE LAST YEAR, HAVE YOU BEEN KICKED, HIT, SLAPPED, OR OTHERWISE PHYSICALLY HURT BY YOUR PARTNER OR EX-PARTNER?: NO
ARE YOU MARRIED, WIDOWED, DIVORCED, SEPARATED, NEVER MARRIED, OR LIVING WITH A PARTNER?: NEVER MARRIED
WITHIN THE LAST YEAR, HAVE YOU BEEN AFRAID OF YOUR PARTNER OR EX-PARTNER?: NO
WITHIN THE LAST YEAR, HAVE TO BEEN RAPED OR FORCED TO HAVE ANY KIND OF SEXUAL ACTIVITY BY YOUR PARTNER OR EX-PARTNER?: NO
HOW OFTEN DO YOU GET TOGETHER WITH FRIENDS OR RELATIVES?: MORE THAN THREE TIMES A WEEK
HOW HARD IS IT FOR YOU TO PAY FOR THE VERY BASICS LIKE FOOD, HOUSING, MEDICAL CARE, AND HEATING?: NOT HARD AT ALL
DO YOU BELONG TO ANY CLUBS OR ORGANIZATIONS SUCH AS CHURCH GROUPS UNIONS, FRATERNAL OR ATHLETIC GROUPS, OR SCHOOL GROUPS?: NO

## 2025-08-13 ASSESSMENT — EDINBURGH POSTNATAL DEPRESSION SCALE (EPDS)
THE THOUGHT OF HARMING MYSELF HAS OCCURRED TO ME: NEVER
I HAVE LOOKED FORWARD WITH ENJOYMENT TO THINGS: AS MUCH AS I EVER DID
THINGS HAVE BEEN GETTING ON TOP OF ME: NO, MOST OF THE TIME I HAVE COPED QUITE WELL
I HAVE BEEN SO UNHAPPY THAT I HAVE HAD DIFFICULTY SLEEPING: NOT AT ALL
I HAVE FELT SAD OR MISERABLE: NO, NOT AT ALL
TOTAL SCORE: 5
I HAVE BEEN ANXIOUS OR WORRIED FOR NO GOOD REASON: YES, SOMETIMES
I HAVE BLAMED MYSELF UNNECESSARILY WHEN THINGS WENT WRONG: YES, SOME OF THE TIME
I HAVE BEEN ABLE TO LAUGH AND SEE THE FUNNY SIDE OF THINGS: AS MUCH AS I ALWAYS COULD
I HAVE BEEN SO UNHAPPY THAT I HAVE BEEN CRYING: NO, NEVER
I HAVE FELT SCARED OR PANICKY FOR NO GOOD REASON: NO, NOT AT ALL

## 2025-08-13 ASSESSMENT — LIFESTYLE VARIABLES
AUDIT-C TOTAL SCORE: 0
HOW OFTEN DO YOU HAVE A DRINK CONTAINING ALCOHOL: NEVER
HOW OFTEN DO YOU HAVE SIX OR MORE DRINKS ON ONE OCCASION: NEVER
SKIP TO QUESTIONS 9-10: 1
HOW MANY STANDARD DRINKS CONTAINING ALCOHOL DO YOU HAVE ON A TYPICAL DAY: PATIENT DOES NOT DRINK

## 2025-08-14 LAB
EST. AVERAGE GLUCOSE BLD GHB EST-MCNC: 203 MG/DL
EST. AVERAGE GLUCOSE BLD GHB EST-SCNC: 11.2 MMOL/L
HBA1C MFR BLD: 8.7 %

## 2025-08-15 LAB
BACTERIA UR CULT: NORMAL
C TRACH RRNA SPEC QL NAA+PROBE: NOT DETECTED
CREAT UR-MCNC: 146 MG/DL (ref 20–275)
N GONORRHOEA RRNA SPEC QL NAA+PROBE: NOT DETECTED
PROT UR-MCNC: 975 MG/DL (ref 5–24)
PROT/CREAT UR: 6.68 MG/MG CREAT (ref 0.02–0.18)
PROT/CREAT UR: 6678 MG/G CREAT (ref 24–184)
QUEST GC CT AMPLIFIED (ALWAYS MESSAGE): NORMAL

## 2025-08-29 ENCOUNTER — PATIENT MESSAGE (OUTPATIENT)
Dept: MATERNAL FETAL MEDICINE | Facility: CLINIC | Age: 30
End: 2025-08-29
Payer: COMMERCIAL

## 2025-09-01 PROBLEM — E13.9 LADA (LATENT AUTOIMMUNE DIABETES IN ADULTS), MANAGED AS TYPE 2 (MULTI): Status: ACTIVE | Noted: 2025-09-01

## 2025-09-01 PROBLEM — Z3A.09 9 WEEKS GESTATION OF PREGNANCY (HHS-HCC): Status: ACTIVE | Noted: 2025-08-13

## 2025-09-01 PROBLEM — R35.0 URINE FREQUENCY: Status: RESOLVED | Noted: 2023-10-26 | Resolved: 2025-09-01

## 2025-09-02 ENCOUNTER — CONSULT (OUTPATIENT)
Dept: MATERNAL FETAL MEDICINE | Facility: CLINIC | Age: 30
End: 2025-09-02
Payer: COMMERCIAL

## 2025-09-02 ENCOUNTER — LAB REQUISITION (OUTPATIENT)
Dept: LAB | Facility: HOSPITAL | Age: 30
End: 2025-09-02
Payer: COMMERCIAL

## 2025-09-02 ENCOUNTER — PHARMACY VISIT (OUTPATIENT)
Dept: PHARMACY | Facility: CLINIC | Age: 30
End: 2025-09-02
Payer: MEDICAID

## 2025-09-02 ENCOUNTER — HOSPITAL ENCOUNTER (OUTPATIENT)
Dept: RADIOLOGY | Facility: CLINIC | Age: 30
Discharge: HOME | End: 2025-09-02
Payer: COMMERCIAL

## 2025-09-02 VITALS
BODY MASS INDEX: 24.05 KG/M2 | HEART RATE: 84 BPM | SYSTOLIC BLOOD PRESSURE: 113 MMHG | WEIGHT: 140.13 LBS | DIASTOLIC BLOOD PRESSURE: 74 MMHG

## 2025-09-02 DIAGNOSIS — Z87.59 HISTORY OF PRE-ECLAMPSIA: ICD-10-CM

## 2025-09-02 DIAGNOSIS — Z79.4 TYPE 2 DIABETES MELLITUS WITH HYPERGLYCEMIA, WITH LONG-TERM CURRENT USE OF INSULIN: ICD-10-CM

## 2025-09-02 DIAGNOSIS — E11.29 TYPE 2 DIABETES MELLITUS WITH DIABETIC MICROALBUMINURIA, WITH LONG-TERM CURRENT USE OF INSULIN (MULTI): ICD-10-CM

## 2025-09-02 DIAGNOSIS — O24.019 PRE-EXISTING TYPE 1 DIABETES MELLITUS DURING PREGNANCY, ANTEPARTUM (HHS-HCC): ICD-10-CM

## 2025-09-02 DIAGNOSIS — Z3A.09 9 WEEKS GESTATION OF PREGNANCY (HHS-HCC): ICD-10-CM

## 2025-09-02 DIAGNOSIS — R80.9 TYPE 2 DIABETES MELLITUS WITH DIABETIC MICROALBUMINURIA, WITH LONG-TERM CURRENT USE OF INSULIN (MULTI): ICD-10-CM

## 2025-09-02 DIAGNOSIS — Z79.4 TYPE 2 DIABETES MELLITUS WITH DIABETIC MICROALBUMINURIA, WITH LONG-TERM CURRENT USE OF INSULIN (MULTI): ICD-10-CM

## 2025-09-02 DIAGNOSIS — O12.10 PROTEINURIA AFFECTING PREGNANCY, ANTEPARTUM (HHS-HCC): Primary | ICD-10-CM

## 2025-09-02 DIAGNOSIS — Z79.4 LONG TERM (CURRENT) USE OF INSULIN (MULTI): ICD-10-CM

## 2025-09-02 DIAGNOSIS — E11.65 TYPE 2 DIABETES MELLITUS WITH HYPERGLYCEMIA (MULTI): ICD-10-CM

## 2025-09-02 DIAGNOSIS — E13.9: ICD-10-CM

## 2025-09-02 DIAGNOSIS — E11.65 TYPE 2 DIABETES MELLITUS WITH HYPERGLYCEMIA, WITH LONG-TERM CURRENT USE OF INSULIN: ICD-10-CM

## 2025-09-02 DIAGNOSIS — Z3A.01 6 WEEKS GESTATION OF PREGNANCY (HHS-HCC): ICD-10-CM

## 2025-09-02 LAB
ABO GROUP (TYPE) IN BLOOD: NORMAL
ALBUMIN SERPL BCP-MCNC: 3.1 G/DL (ref 3.4–5)
ALP SERPL-CCNC: 34 U/L (ref 33–110)
ALT SERPL W P-5'-P-CCNC: 15 U/L (ref 7–45)
ANION GAP SERPL CALC-SCNC: 9 MMOL/L (ref 10–20)
ANTIBODY SCREEN: NORMAL
AST SERPL W P-5'-P-CCNC: 14 U/L (ref 9–39)
BILIRUB SERPL-MCNC: 0.7 MG/DL (ref 0–1.2)
BUN SERPL-MCNC: 7 MG/DL (ref 6–23)
CALCIUM SERPL-MCNC: 8.4 MG/DL (ref 8.6–10.6)
CHLORIDE SERPL-SCNC: 104 MMOL/L (ref 98–107)
CO2 SERPL-SCNC: 26 MMOL/L (ref 21–32)
CREAT SERPL-MCNC: 0.66 MG/DL (ref 0.5–1.05)
EGFRCR SERPLBLD CKD-EPI 2021: >90 ML/MIN/1.73M*2
ERYTHROCYTE [DISTWIDTH] IN BLOOD BY AUTOMATED COUNT: 11.6 % (ref 11.5–14.5)
GLUCOSE BLD MANUAL STRIP-MCNC: 165 MG/DL (ref 74–99)
GLUCOSE SERPL-MCNC: 110 MG/DL (ref 74–99)
HCT VFR BLD AUTO: 33.7 % (ref 36–46)
HGB BLD-MCNC: 11.2 G/DL (ref 12–16)
MCH RBC QN AUTO: 31.1 PG (ref 26–34)
MCHC RBC AUTO-ENTMCNC: 33.2 G/DL (ref 32–36)
MCV RBC AUTO: 94 FL (ref 80–100)
NRBC BLD-RTO: 0 /100 WBCS (ref 0–0)
PLATELET # BLD AUTO: 233 X10*3/UL (ref 150–450)
POTASSIUM SERPL-SCNC: 3.8 MMOL/L (ref 3.5–5.3)
PROT SERPL-MCNC: 5.4 G/DL (ref 6.4–8.2)
RBC # BLD AUTO: 3.6 X10*6/UL (ref 4–5.2)
REFLEX ADDED, ANEMIA PANEL: NORMAL
RH FACTOR (ANTIGEN D): NORMAL
SODIUM SERPL-SCNC: 135 MMOL/L (ref 136–145)
WBC # BLD AUTO: 4.4 X10*3/UL (ref 4.4–11.3)

## 2025-09-02 PROCEDURE — 82947 ASSAY GLUCOSE BLOOD QUANT: CPT | Performed by: OBSTETRICS & GYNECOLOGY

## 2025-09-02 PROCEDURE — RXMED WILLOW AMBULATORY MEDICATION CHARGE

## 2025-09-02 PROCEDURE — 99204 OFFICE O/P NEW MOD 45 MIN: CPT | Performed by: OBSTETRICS & GYNECOLOGY

## 2025-09-02 PROCEDURE — 99214 OFFICE O/P EST MOD 30 MIN: CPT | Mod: 25 | Performed by: OBSTETRICS & GYNECOLOGY

## 2025-09-02 PROCEDURE — 86900 BLOOD TYPING SEROLOGIC ABO: CPT

## 2025-09-02 PROCEDURE — 83021 HEMOGLOBIN CHROMOTOGRAPHY: CPT

## 2025-09-02 PROCEDURE — 76801 OB US < 14 WKS SINGLE FETUS: CPT | Performed by: STUDENT IN AN ORGANIZED HEALTH CARE EDUCATION/TRAINING PROGRAM

## 2025-09-02 PROCEDURE — 86901 BLOOD TYPING SEROLOGIC RH(D): CPT

## 2025-09-02 PROCEDURE — 85027 COMPLETE CBC AUTOMATED: CPT

## 2025-09-02 PROCEDURE — 86850 RBC ANTIBODY SCREEN: CPT

## 2025-09-02 PROCEDURE — 76801 OB US < 14 WKS SINGLE FETUS: CPT

## 2025-09-02 RX ORDER — NAPROXEN SODIUM 220 MG/1
162 TABLET, FILM COATED ORAL DAILY
Qty: 60 TABLET | Refills: 11 | Status: SHIPPED | OUTPATIENT
Start: 2025-09-02 | End: 2025-09-02

## 2025-09-02 RX ORDER — NAPROXEN SODIUM 220 MG/1
162 TABLET, FILM COATED ORAL DAILY
Qty: 60 TABLET | Refills: 11 | Status: SHIPPED | OUTPATIENT
Start: 2025-09-02 | End: 2026-09-02

## 2025-09-02 ASSESSMENT — PAIN SCALES - GENERAL: PAINLEVEL_OUTOF10: 0-NO PAIN

## 2025-09-03 LAB
C TRACH RRNA SPEC QL NAA+PROBE: NOT DETECTED
N GONORRHOEA RRNA SPEC QL NAA+PROBE: NOT DETECTED
QUEST GC CT AMPLIFIED (ALWAYS MESSAGE): NORMAL

## 2025-09-04 LAB
BACTERIA UR CULT: NORMAL
HBV SURFACE AG SERPL QL IA: NORMAL
HCV AB SERPL QL IA: NORMAL
HEMOGLOBIN A2: 2.9 % (ref 2–3.5)
HEMOGLOBIN A: 97 % (ref 95.8–98)
HEMOGLOBIN F: 0.1 % (ref 0–2)
HEMOGLOBIN IDENTIFICATION INTERPRETATION: NORMAL
HIV 1+2 AB+HIV1 P24 AG SERPL QL IA: NORMAL
HIV 1+2 AB+HIV1 P24 AG SERPL QL IA: NORMAL
PATH REVIEW-HGB IDENTIFICATION: NORMAL
RUBV IGG SERPL IA-ACNC: 2.19 INDEX
T PALLIDUM AB SER QL IA: NEGATIVE
TSH SERPL-ACNC: 2.64 MIU/L

## 2025-09-24 ENCOUNTER — APPOINTMENT (OUTPATIENT)
Facility: CLINIC | Age: 30
End: 2025-09-24
Payer: COMMERCIAL

## 2025-09-29 ENCOUNTER — APPOINTMENT (OUTPATIENT)
Dept: PULMONOLOGY | Facility: HOSPITAL | Age: 30
End: 2025-09-29
Payer: COMMERCIAL

## 2025-10-27 ENCOUNTER — APPOINTMENT (OUTPATIENT)
Dept: PRIMARY CARE | Facility: CLINIC | Age: 30
End: 2025-10-27
Payer: COMMERCIAL